# Patient Record
Sex: FEMALE | Race: WHITE | NOT HISPANIC OR LATINO | Employment: FULL TIME | URBAN - METROPOLITAN AREA
[De-identification: names, ages, dates, MRNs, and addresses within clinical notes are randomized per-mention and may not be internally consistent; named-entity substitution may affect disease eponyms.]

---

## 2023-03-15 PROBLEM — Z04.9 CONDITION NOT FOUND: Status: ACTIVE | Noted: 2023-03-15

## 2023-03-15 PROBLEM — R60.0 LEG EDEMA, LEFT: Status: ACTIVE | Noted: 2023-03-15

## 2023-03-15 PROBLEM — D50.9 FE DEFICIENCY ANEMIA: Status: ACTIVE | Noted: 2023-03-15

## 2023-03-15 PROBLEM — B35.3 TINEA PEDIS: Status: ACTIVE | Noted: 2023-03-15

## 2023-03-15 PROBLEM — D49.89: Status: ACTIVE | Noted: 2023-03-15

## 2023-03-15 PROBLEM — R10.30 ABDOMINAL PAIN, LOWER: Status: ACTIVE | Noted: 2023-03-15

## 2023-03-15 PROBLEM — R14.0 ABDOMINAL DISTENSION: Status: ACTIVE | Noted: 2023-03-15

## 2023-03-15 PROBLEM — E03.9 HYPOTHYROIDISM: Status: ACTIVE | Noted: 2023-03-15

## 2023-03-15 PROBLEM — B35.0 TINEA CAPITIS: Status: ACTIVE | Noted: 2023-03-15

## 2023-03-15 PROBLEM — L30.9 DERMATITIS, ECZEMATOID: Status: ACTIVE | Noted: 2023-03-15

## 2023-03-15 PROBLEM — R14.0 ABDOMINAL BLOATING: Status: ACTIVE | Noted: 2023-03-15

## 2023-03-15 PROBLEM — J01.90 ACUTE SINUSITIS: Status: ACTIVE | Noted: 2023-03-15

## 2023-03-15 PROBLEM — G43.909 MIGRAINE HEADACHE: Status: ACTIVE | Noted: 2023-03-15

## 2023-03-15 PROBLEM — J02.9 SORE THROAT: Status: ACTIVE | Noted: 2023-03-15

## 2023-03-15 PROBLEM — R19.7 DIARRHEA: Status: ACTIVE | Noted: 2023-03-15

## 2023-03-15 PROBLEM — R55 SYNCOPE AND COLLAPSE: Status: ACTIVE | Noted: 2023-03-15

## 2023-03-15 PROBLEM — N63.20 LEFT BREAST LUMP: Status: ACTIVE | Noted: 2023-03-15

## 2023-03-15 PROBLEM — R31.29 HEMATURIA, MICROSCOPIC: Status: ACTIVE | Noted: 2023-03-15

## 2023-03-15 PROBLEM — R09.81 SINUS CONGESTION: Status: ACTIVE | Noted: 2023-03-15

## 2023-03-15 PROBLEM — Z95.828 PORT-A-CATH IN PLACE: Status: ACTIVE | Noted: 2023-03-15

## 2023-03-15 PROBLEM — R92.8 ABNORMAL MAMMOGRAM: Status: ACTIVE | Noted: 2023-03-15

## 2023-03-15 PROBLEM — T82.9XXA COMPLICATION OF CENTRAL VENOUS CATHETER: Status: ACTIVE | Noted: 2023-03-15

## 2023-03-15 PROBLEM — J30.9 ALLERGIC RHINITIS WITH POSTNASAL DRIP: Status: ACTIVE | Noted: 2023-03-15

## 2023-03-15 PROBLEM — J30.9 ALLERGIC SINUSITIS: Status: ACTIVE | Noted: 2023-03-15

## 2023-03-15 PROBLEM — G47.30 SLEEP APNEA: Status: ACTIVE | Noted: 2023-03-15

## 2023-03-15 PROBLEM — M25.579 ANKLE JOINT PAIN: Status: ACTIVE | Noted: 2023-03-15

## 2023-03-15 PROBLEM — F41.9 ANXIETY AND DEPRESSION: Status: ACTIVE | Noted: 2023-03-15

## 2023-03-15 PROBLEM — R21 DISCOID RASH: Status: ACTIVE | Noted: 2023-03-15

## 2023-03-15 PROBLEM — J01.10 ACUTE FRONTAL SINUSITIS: Status: ACTIVE | Noted: 2023-03-15

## 2023-03-15 PROBLEM — R53.83 FATIGUE: Status: ACTIVE | Noted: 2023-03-15

## 2023-03-15 PROBLEM — R63.5 UNEXPLAINED WEIGHT GAIN: Status: ACTIVE | Noted: 2023-03-15

## 2023-03-15 PROBLEM — J32.9 SINUSITIS: Status: ACTIVE | Noted: 2023-03-15

## 2023-03-15 PROBLEM — G70.00: Status: ACTIVE | Noted: 2023-03-15

## 2023-03-15 PROBLEM — S06.0XAA CONCUSSION: Status: ACTIVE | Noted: 2023-03-15

## 2023-03-15 PROBLEM — R07.9 CHEST PAIN: Status: ACTIVE | Noted: 2023-03-15

## 2023-03-15 PROBLEM — R10.32 LEFT INGUINAL PAIN: Status: ACTIVE | Noted: 2023-03-15

## 2023-03-15 PROBLEM — F32.A ANXIETY AND DEPRESSION: Status: ACTIVE | Noted: 2023-03-15

## 2023-03-15 PROBLEM — K52.9 GASTROENTERITIS: Status: ACTIVE | Noted: 2023-03-15

## 2023-03-15 PROBLEM — R09.82 ALLERGIC RHINITIS WITH POSTNASAL DRIP: Status: ACTIVE | Noted: 2023-03-15

## 2023-03-15 PROBLEM — L03.211 CELLULITIS OF CHIN: Status: ACTIVE | Noted: 2023-03-15

## 2023-03-15 PROBLEM — M32.9 SYSTEMIC LUPUS ERYTHEMATOSUS (MULTI): Status: ACTIVE | Noted: 2023-03-15

## 2023-03-15 PROBLEM — F32.A DEPRESSION: Status: ACTIVE | Noted: 2023-03-15

## 2023-03-15 PROBLEM — R00.2 PALPITATIONS: Status: ACTIVE | Noted: 2023-03-15

## 2023-03-15 PROBLEM — L28.2 PRURITIC RASH: Status: ACTIVE | Noted: 2023-03-15

## 2023-03-15 PROBLEM — M77.8 TENDINITIS OF FLEXOR TENDON OF RIGHT HAND: Status: ACTIVE | Noted: 2023-03-15

## 2023-03-15 PROBLEM — H93.8X3 SENSATION OF FULLNESS IN BOTH EARS: Status: ACTIVE | Noted: 2023-03-15

## 2023-03-15 PROBLEM — N64.4 PAIN OF LEFT BREAST: Status: ACTIVE | Noted: 2023-03-15

## 2023-03-15 PROBLEM — R42 LIGHTHEADEDNESS: Status: ACTIVE | Noted: 2023-03-15

## 2023-03-15 PROBLEM — D70.9 NEUTROPENIA (CMS-HCC): Status: ACTIVE | Noted: 2023-03-15

## 2023-03-15 PROBLEM — R68.89 FLU-LIKE SYMPTOMS: Status: ACTIVE | Noted: 2023-03-15

## 2023-03-15 PROBLEM — G40.909 SEIZURE, EPILEPTIC (MULTI): Status: ACTIVE | Noted: 2023-03-15

## 2023-03-15 PROBLEM — R19.8 INCREASED ABDOMINAL GIRTH: Status: ACTIVE | Noted: 2023-03-15

## 2023-03-15 PROBLEM — R51.9 HEADACHE, CHRONIC DAILY: Status: ACTIVE | Noted: 2023-03-15

## 2023-03-15 PROBLEM — Z98.890 HISTORY OF VASCULAR ACCESS DEVICE: Status: ACTIVE | Noted: 2023-03-15

## 2023-03-15 PROBLEM — M76.71 PERONEAL TENDINITIS OF RIGHT LOWER EXTREMITY: Status: ACTIVE | Noted: 2023-03-15

## 2023-03-15 PROBLEM — L70.9 ADULT ACNE: Status: ACTIVE | Noted: 2023-03-15

## 2023-03-15 PROBLEM — K21.9 GERD WITHOUT ESOPHAGITIS: Status: ACTIVE | Noted: 2023-03-15

## 2023-03-15 PROBLEM — K52.9 COLITIS: Status: ACTIVE | Noted: 2023-03-15

## 2023-03-15 PROBLEM — J22 ACUTE RESPIRATORY INFECTION: Status: ACTIVE | Noted: 2023-03-15

## 2023-03-15 PROBLEM — I10 EPISODE OF HYPERTENSION: Status: ACTIVE | Noted: 2023-03-15

## 2023-03-15 RX ORDER — DIPHENHYDRAMINE HCL 25 MG
1-2 TABLET ORAL AS NEEDED
COMMUNITY
Start: 2021-06-02

## 2023-03-15 RX ORDER — FERROUS SULFATE 325(65) MG
1 TABLET ORAL EVERY OTHER DAY
COMMUNITY
Start: 2016-04-19

## 2023-03-15 RX ORDER — AZELASTINE HCL 205.5 UG/1
2 SPRAY NASAL 2 TIMES DAILY
COMMUNITY
Start: 2022-06-23

## 2023-03-15 RX ORDER — CITALOPRAM 10 MG/1
1 TABLET ORAL DAILY
COMMUNITY
Start: 2022-05-17 | End: 2023-06-26 | Stop reason: SDUPTHER

## 2023-03-15 RX ORDER — METOPROLOL SUCCINATE 25 MG/1
1.5 TABLET, EXTENDED RELEASE ORAL EVERY EVENING
COMMUNITY
Start: 2020-10-07 | End: 2023-10-09 | Stop reason: SDUPTHER

## 2023-03-15 RX ORDER — FOLIC ACID 0.8 MG
1 TABLET ORAL DAILY
COMMUNITY
Start: 2015-09-17

## 2023-03-15 RX ORDER — HEPARIN SODIUM 1000 [USP'U]/ML
INJECTION, SOLUTION INTRAVENOUS; SUBCUTANEOUS
COMMUNITY
Start: 2022-02-16

## 2023-03-15 RX ORDER — LAMOTRIGINE 200 MG/1
1 TABLET ORAL 2 TIMES DAILY
COMMUNITY
Start: 2013-10-06 | End: 2023-05-10 | Stop reason: SDUPTHER

## 2023-03-15 RX ORDER — VIT C/E/ZN/COPPR/LUTEIN/ZEAXAN 250MG-90MG
1 CAPSULE ORAL DAILY
COMMUNITY
Start: 2022-02-02

## 2023-03-15 RX ORDER — ECULIZUMAB 300 MG/30ML
INJECTION, SOLUTION, CONCENTRATE INTRAVENOUS
COMMUNITY

## 2023-03-15 RX ORDER — LIDOCAINE AND PRILOCAINE 25; 25 MG/G; MG/G
CREAM TOPICAL
COMMUNITY
Start: 2018-01-02

## 2023-03-15 RX ORDER — LOPERAMIDE HCL 2 MG
TABLET ORAL
COMMUNITY
Start: 2022-10-05

## 2023-03-20 ENCOUNTER — OFFICE VISIT (OUTPATIENT)
Dept: PRIMARY CARE | Facility: CLINIC | Age: 43
End: 2023-03-20
Payer: COMMERCIAL

## 2023-03-20 VITALS
SYSTOLIC BLOOD PRESSURE: 108 MMHG | WEIGHT: 134 LBS | DIASTOLIC BLOOD PRESSURE: 85 MMHG | BODY MASS INDEX: 22.33 KG/M2 | HEART RATE: 97 BPM | HEIGHT: 65 IN

## 2023-03-20 DIAGNOSIS — H65.04 RECURRENT ACUTE SEROUS OTITIS MEDIA OF RIGHT EAR: Primary | ICD-10-CM

## 2023-03-20 PROCEDURE — 1036F TOBACCO NON-USER: CPT | Performed by: FAMILY MEDICINE

## 2023-03-20 PROCEDURE — 99213 OFFICE O/P EST LOW 20 MIN: CPT | Performed by: FAMILY MEDICINE

## 2023-03-20 RX ORDER — PREDNISONE 20 MG/1
20 TABLET ORAL 2 TIMES DAILY
Qty: 6 TABLET | Refills: 0 | Status: SHIPPED | OUTPATIENT
Start: 2023-03-20 | End: 2023-03-23

## 2023-03-20 ASSESSMENT — ENCOUNTER SYMPTOMS
MUSCULOSKELETAL NEGATIVE: 1
NEUROLOGICAL NEGATIVE: 1
CONSTITUTIONAL NEGATIVE: 1
RESPIRATORY NEGATIVE: 1
GASTROINTESTINAL NEGATIVE: 1
SINUS PRESSURE: 1
CARDIOVASCULAR NEGATIVE: 1

## 2023-03-28 ENCOUNTER — TELEPHONE (OUTPATIENT)
Dept: PRIMARY CARE | Facility: CLINIC | Age: 43
End: 2023-03-28

## 2023-04-06 ENCOUNTER — OFFICE VISIT (OUTPATIENT)
Dept: PRIMARY CARE | Facility: CLINIC | Age: 43
End: 2023-04-06
Payer: COMMERCIAL

## 2023-04-06 VITALS
BODY MASS INDEX: 22.16 KG/M2 | DIASTOLIC BLOOD PRESSURE: 80 MMHG | SYSTOLIC BLOOD PRESSURE: 113 MMHG | HEART RATE: 85 BPM | WEIGHT: 133 LBS | HEIGHT: 65 IN

## 2023-04-06 DIAGNOSIS — R92.8 OTHER ABNORMAL AND INCONCLUSIVE FINDINGS ON DIAGNOSTIC IMAGING OF BREAST: ICD-10-CM

## 2023-04-06 DIAGNOSIS — L30.9 ECZEMA, UNSPECIFIED TYPE: ICD-10-CM

## 2023-04-06 DIAGNOSIS — M32.9 SYSTEMIC LUPUS ERYTHEMATOSUS, UNSPECIFIED SLE TYPE, UNSPECIFIED ORGAN INVOLVEMENT STATUS (MULTI): ICD-10-CM

## 2023-04-06 DIAGNOSIS — I10 EPISODE OF HYPERTENSION: Primary | ICD-10-CM

## 2023-04-06 DIAGNOSIS — G70.00 MYASTHENIA GRAVIS ASSOCIATED WITH THYMOMA (MULTI): ICD-10-CM

## 2023-04-06 DIAGNOSIS — K21.9 GERD WITHOUT ESOPHAGITIS: ICD-10-CM

## 2023-04-06 DIAGNOSIS — Z12.31 SCREENING MAMMOGRAM, ENCOUNTER FOR: ICD-10-CM

## 2023-04-06 DIAGNOSIS — D70.2 OTHER DRUG-INDUCED NEUTROPENIA (CMS-HCC): ICD-10-CM

## 2023-04-06 DIAGNOSIS — Z00.00 LABORATORY TESTS ORDERED AS PART OF A COMPLETE PHYSICAL EXAM (CPE): ICD-10-CM

## 2023-04-06 DIAGNOSIS — E32.9 THYMUS DISORDER (MULTI): ICD-10-CM

## 2023-04-06 DIAGNOSIS — D49.89 MYASTHENIA GRAVIS ASSOCIATED WITH THYMOMA (MULTI): ICD-10-CM

## 2023-04-06 DIAGNOSIS — G40.909 NONINTRACTABLE EPILEPSY WITHOUT STATUS EPILEPTICUS, UNSPECIFIED EPILEPSY TYPE (MULTI): ICD-10-CM

## 2023-04-06 DIAGNOSIS — F32.A DEPRESSION, UNSPECIFIED DEPRESSION TYPE: ICD-10-CM

## 2023-04-06 PROBLEM — J02.9 SORE THROAT: Status: RESOLVED | Noted: 2023-03-15 | Resolved: 2023-04-06

## 2023-04-06 PROBLEM — R09.81 SINUS CONGESTION: Status: RESOLVED | Noted: 2023-03-15 | Resolved: 2023-04-06

## 2023-04-06 PROBLEM — J32.9 SINUSITIS: Status: RESOLVED | Noted: 2023-03-15 | Resolved: 2023-04-06

## 2023-04-06 PROBLEM — F43.21 ADJUSTMENT DISORDER WITH DEPRESSED MOOD: Status: ACTIVE | Noted: 2022-03-23

## 2023-04-06 PROBLEM — J30.9 ALLERGIC SINUSITIS: Status: RESOLVED | Noted: 2023-03-15 | Resolved: 2023-04-06

## 2023-04-06 PROBLEM — J01.90 ACUTE SINUSITIS: Status: RESOLVED | Noted: 2023-03-15 | Resolved: 2023-04-06

## 2023-04-06 PROBLEM — R68.89 FLU-LIKE SYMPTOMS: Status: RESOLVED | Noted: 2023-03-15 | Resolved: 2023-04-06

## 2023-04-06 PROBLEM — J22 ACUTE RESPIRATORY INFECTION: Status: RESOLVED | Noted: 2023-03-15 | Resolved: 2023-04-06

## 2023-04-06 PROBLEM — J01.10 ACUTE FRONTAL SINUSITIS: Status: RESOLVED | Noted: 2023-03-15 | Resolved: 2023-04-06

## 2023-04-06 PROBLEM — R31.29 HEMATURIA, MICROSCOPIC: Status: RESOLVED | Noted: 2023-03-15 | Resolved: 2023-04-06

## 2023-04-06 PROCEDURE — 99396 PREV VISIT EST AGE 40-64: CPT | Performed by: FAMILY MEDICINE

## 2023-04-06 PROCEDURE — 1036F TOBACCO NON-USER: CPT | Performed by: FAMILY MEDICINE

## 2023-04-06 RX ORDER — CLOTRIMAZOLE AND BETAMETHASONE DIPROPIONATE 10; .64 MG/G; MG/G
1 CREAM TOPICAL 2 TIMES DAILY
Qty: 30 G | Refills: 0 | Status: SHIPPED | OUTPATIENT
Start: 2023-04-06 | End: 2023-06-05

## 2023-04-06 ASSESSMENT — ENCOUNTER SYMPTOMS
RESPIRATORY NEGATIVE: 1
NEUROLOGICAL NEGATIVE: 1
GASTROINTESTINAL NEGATIVE: 1
CARDIOVASCULAR NEGATIVE: 1
MUSCULOSKELETAL NEGATIVE: 1
CONSTITUTIONAL NEGATIVE: 1

## 2023-04-06 NOTE — PROGRESS NOTES
"Subjective   Patient ID: Luisa Ingram is a 43 y.o. female who presents for No chief complaint on file..    HPI fu htn, chol, depression , anemia ,serous otitis  Stable from baseline  Review of Systems   Constitutional: Negative.    HENT: Negative.     Respiratory: Negative.     Cardiovascular: Negative.    Gastrointestinal: Negative.    Musculoskeletal: Negative.    Neurological: Negative.        Objective   /80   Pulse 85   Ht 1.651 m (5' 5\")   Wt 60.3 kg (133 lb)   BMI 22.13 kg/m²     Physical Exam  Vitals reviewed.   Constitutional:       Appearance: Normal appearance. She is normal weight.   Eyes:      Extraocular Movements: Extraocular movements intact.      Conjunctiva/sclera: Conjunctivae normal.      Pupils: Pupils are equal, round, and reactive to light.   Neck:      Comments: Has port in place  Cardiovascular:      Rate and Rhythm: Normal rate and regular rhythm.      Pulses: Normal pulses.      Heart sounds: Normal heart sounds.   Pulmonary:      Effort: Pulmonary effort is normal.      Breath sounds: Normal breath sounds.   Abdominal:      General: Bowel sounds are normal.      Palpations: Abdomen is soft.   Musculoskeletal:         General: Normal range of motion.   Skin:     General: Skin is warm and dry.   Neurological:      General: No focal deficit present.      Mental Status: She is alert and oriented to person, place, and time. Mental status is at baseline.         Assessment/Plan   Problem List Items Addressed This Visit          Nervous    Myasthenia gravis associated with thymoma (CMS/HCC)    Seizure, epileptic (CMS/HCC)       Circulatory    Episode of hypertension - Primary    Thymus disorder (CMS/HCC)       Digestive    GERD without esophagitis       Other    Depression    Neutropenia (CMS/HCC)    Systemic lupus erythematosus (CMS/HCC)     Other Visit Diagnoses       Laboratory tests ordered as part of a complete physical exam (CPE)        Screening mammogram, encounter for     "          Bw from krish is stable will recheck 1mo and will add lipid panel  Get mammogram

## 2023-04-06 NOTE — PROGRESS NOTES
Pt comes in for a cpe. Pt also states is still having issues with her ear that she saw you for last time she was here.

## 2023-05-10 DIAGNOSIS — D49.89 MYASTHENIA GRAVIS ASSOCIATED WITH THYMOMA (MULTI): Primary | ICD-10-CM

## 2023-05-10 DIAGNOSIS — G70.00 MYASTHENIA GRAVIS ASSOCIATED WITH THYMOMA (MULTI): Primary | ICD-10-CM

## 2023-05-10 RX ORDER — LAMOTRIGINE 200 MG/1
200 TABLET ORAL 2 TIMES DAILY
Qty: 180 TABLET | Refills: 1 | Status: SHIPPED | OUTPATIENT
Start: 2023-05-10 | End: 2023-12-11 | Stop reason: SDUPTHER

## 2023-05-16 ENCOUNTER — APPOINTMENT (OUTPATIENT)
Dept: PRIMARY CARE | Facility: CLINIC | Age: 43
End: 2023-05-16
Payer: COMMERCIAL

## 2023-06-26 DIAGNOSIS — F32.A DEPRESSION, UNSPECIFIED DEPRESSION TYPE: Primary | ICD-10-CM

## 2023-06-26 RX ORDER — CITALOPRAM 10 MG/1
10 TABLET ORAL DAILY
Qty: 90 TABLET | Refills: 1 | Status: SHIPPED | OUTPATIENT
Start: 2023-06-26 | End: 2023-12-11 | Stop reason: SDUPTHER

## 2023-09-12 ENCOUNTER — TELEPHONE (OUTPATIENT)
Dept: PRIMARY CARE | Facility: CLINIC | Age: 43
End: 2023-09-12
Payer: COMMERCIAL

## 2023-09-12 DIAGNOSIS — H65.04 RECURRENT ACUTE SEROUS OTITIS MEDIA OF RIGHT EAR: Primary | ICD-10-CM

## 2023-09-12 RX ORDER — CEFPROZIL 500 MG/1
500 TABLET, FILM COATED ORAL 2 TIMES DAILY
Qty: 14 TABLET | Refills: 0 | Status: SHIPPED | OUTPATIENT
Start: 2023-09-12 | End: 2023-09-19

## 2023-10-06 ENCOUNTER — TELEPHONE (OUTPATIENT)
Dept: CARDIOLOGY | Facility: HOSPITAL | Age: 43
End: 2023-10-06
Payer: COMMERCIAL

## 2023-10-09 DIAGNOSIS — R00.2 PALPITATIONS: Primary | ICD-10-CM

## 2023-10-12 ENCOUNTER — OFFICE VISIT (OUTPATIENT)
Dept: PRIMARY CARE | Facility: CLINIC | Age: 43
End: 2023-10-12
Payer: COMMERCIAL

## 2023-10-12 VITALS
HEART RATE: 96 BPM | SYSTOLIC BLOOD PRESSURE: 108 MMHG | WEIGHT: 134 LBS | HEIGHT: 65 IN | DIASTOLIC BLOOD PRESSURE: 88 MMHG | BODY MASS INDEX: 22.33 KG/M2

## 2023-10-12 DIAGNOSIS — F41.9 ANXIETY AND DEPRESSION: ICD-10-CM

## 2023-10-12 DIAGNOSIS — E78.9 ABNORMAL CHOLESTEROL TEST: ICD-10-CM

## 2023-10-12 DIAGNOSIS — I10 EPISODE OF HYPERTENSION: Primary | ICD-10-CM

## 2023-10-12 DIAGNOSIS — F32.A ANXIETY AND DEPRESSION: ICD-10-CM

## 2023-10-12 DIAGNOSIS — Z23 NEED FOR INFLUENZA VACCINATION: ICD-10-CM

## 2023-10-12 PROBLEM — F45.8 BRUXISM: Status: ACTIVE | Noted: 2023-10-12

## 2023-10-12 PROBLEM — M26.629 TMJ PAIN DYSFUNCTION SYNDROME: Status: ACTIVE | Noted: 2023-10-12

## 2023-10-12 PROBLEM — H92.02 OTALGIA, LEFT: Status: ACTIVE | Noted: 2023-10-12

## 2023-10-12 LAB
CHOLEST SERPL-MCNC: 202 MG/DL (ref 0–199)
CHOLESTEROL/HDL RATIO: 3.4
HDLC SERPL-MCNC: 59.9 MG/DL
LDLC SERPL CALC-MCNC: 122 MG/DL
NON HDL CHOLESTEROL: 142 MG/DL (ref 0–149)
TRIGL SERPL-MCNC: 101 MG/DL (ref 0–149)
VLDL: 20 MG/DL (ref 0–40)

## 2023-10-12 PROCEDURE — 36415 COLL VENOUS BLD VENIPUNCTURE: CPT

## 2023-10-12 PROCEDURE — 99213 OFFICE O/P EST LOW 20 MIN: CPT | Performed by: FAMILY MEDICINE

## 2023-10-12 PROCEDURE — 90686 IIV4 VACC NO PRSV 0.5 ML IM: CPT | Performed by: FAMILY MEDICINE

## 2023-10-12 PROCEDURE — G0008 ADMIN INFLUENZA VIRUS VAC: HCPCS | Performed by: FAMILY MEDICINE

## 2023-10-12 PROCEDURE — 1036F TOBACCO NON-USER: CPT | Performed by: FAMILY MEDICINE

## 2023-10-12 PROCEDURE — 80061 LIPID PANEL: CPT

## 2023-10-12 ASSESSMENT — ENCOUNTER SYMPTOMS
NEUROLOGICAL NEGATIVE: 1
CARDIOVASCULAR NEGATIVE: 1
GASTROINTESTINAL NEGATIVE: 1
MUSCULOSKELETAL NEGATIVE: 1
CONSTITUTIONAL NEGATIVE: 1
RESPIRATORY NEGATIVE: 1

## 2023-10-12 NOTE — PROGRESS NOTES
"Subjective   Patient ID: Luisa Ingram is a 43 y.o. female who presents for No chief complaint on file..    HPI lupuls cont w sp, myasthenia cont w neuro, depression stab le no suicidal or omicidal ideation , due for lipid    Review of Systems   Constitutional: Negative.    HENT: Negative.     Respiratory: Negative.     Cardiovascular: Negative.    Gastrointestinal: Negative.    Musculoskeletal: Negative.    Neurological: Negative.        Objective   /88   Pulse 96   Ht 1.651 m (5' 5\")   Wt 60.8 kg (134 lb)   BMI 22.30 kg/m²     Physical Exam  Vitals reviewed.   Constitutional:       Appearance: Normal appearance. She is normal weight.   Eyes:      Extraocular Movements: Extraocular movements intact.      Conjunctiva/sclera: Conjunctivae normal.      Pupils: Pupils are equal, round, and reactive to light.   Cardiovascular:      Rate and Rhythm: Normal rate and regular rhythm.      Pulses: Normal pulses.      Heart sounds: Normal heart sounds.   Pulmonary:      Effort: Pulmonary effort is normal.      Breath sounds: Normal breath sounds.   Abdominal:      General: Bowel sounds are normal.      Palpations: Abdomen is soft.   Musculoskeletal:         General: Normal range of motion.   Skin:     General: Skin is warm and dry.   Neurological:      General: No focal deficit present.      Mental Status: She is alert and oriented to person, place, and time. Mental status is at baseline.         Assessment/Plan   Problem List Items Addressed This Visit             ICD-10-CM    Anxiety and depression F41.9, F32.A    Episode of hypertension - Primary I10     Other Visit Diagnoses         Codes    Need for influenza vaccination     Z23    Relevant Orders    Flu vaccine (IIV4) age 6 months and greater, preservative free    Abnormal cholesterol test     E78.9    Relevant Orders    Lipid Panel               "

## 2023-10-12 NOTE — PROGRESS NOTES
Patient informed no antibiotic needed, per Dr. Jones. She states understanding. No further questions.   Pt comes in for fu. Pt has no concerns today.

## 2023-10-13 ENCOUNTER — TELEPHONE (OUTPATIENT)
Dept: PRIMARY CARE | Facility: CLINIC | Age: 43
End: 2023-10-13
Payer: COMMERCIAL

## 2023-10-13 NOTE — TELEPHONE ENCOUNTER
Spoke with patient       ----- Message from Kyler Crandall MD sent at 10/13/2023  8:00 AM EDT -----  All results are stable  no change

## 2023-10-17 RX ORDER — METOPROLOL SUCCINATE 25 MG/1
37.5 TABLET, EXTENDED RELEASE ORAL EVERY EVENING
Qty: 90 TABLET | Refills: 3 | Status: SHIPPED | OUTPATIENT
Start: 2023-10-17

## 2023-12-11 DIAGNOSIS — G70.00 MYASTHENIA GRAVIS ASSOCIATED WITH THYMOMA (MULTI): ICD-10-CM

## 2023-12-11 DIAGNOSIS — F32.A DEPRESSION, UNSPECIFIED DEPRESSION TYPE: ICD-10-CM

## 2023-12-11 DIAGNOSIS — D49.89 MYASTHENIA GRAVIS ASSOCIATED WITH THYMOMA (MULTI): ICD-10-CM

## 2023-12-11 RX ORDER — LAMOTRIGINE 200 MG/1
200 TABLET ORAL 2 TIMES DAILY
Qty: 180 TABLET | Refills: 1 | Status: SHIPPED | OUTPATIENT
Start: 2023-12-11

## 2023-12-11 RX ORDER — CITALOPRAM 10 MG/1
10 TABLET ORAL DAILY
Qty: 90 TABLET | Refills: 1 | Status: SHIPPED | OUTPATIENT
Start: 2023-12-11

## 2024-01-11 ENCOUNTER — TELEPHONE (OUTPATIENT)
Dept: PRIMARY CARE | Facility: CLINIC | Age: 44
End: 2024-01-11
Payer: COMMERCIAL

## 2024-01-11 DIAGNOSIS — M32.9 LUPUS (MULTI): Primary | ICD-10-CM

## 2024-01-11 NOTE — TELEPHONE ENCOUNTER
Patient states her lupus physician is retiring she is requesting referral for Dr Sehrwin Smith for lupus.

## 2024-02-09 ENCOUNTER — LAB (OUTPATIENT)
Dept: LAB | Facility: LAB | Age: 44
End: 2024-02-09
Payer: COMMERCIAL

## 2024-02-09 ENCOUNTER — OFFICE VISIT (OUTPATIENT)
Dept: RHEUMATOLOGY | Facility: CLINIC | Age: 44
End: 2024-02-09
Payer: COMMERCIAL

## 2024-02-09 VITALS
DIASTOLIC BLOOD PRESSURE: 76 MMHG | SYSTOLIC BLOOD PRESSURE: 109 MMHG | HEART RATE: 76 BPM | WEIGHT: 138 LBS | BODY MASS INDEX: 22.99 KG/M2 | TEMPERATURE: 98.4 F | HEIGHT: 65 IN

## 2024-02-09 DIAGNOSIS — M35.9 UNDIFFERENTIATED CONNECTIVE TISSUE DISEASE (MULTI): ICD-10-CM

## 2024-02-09 DIAGNOSIS — M32.9 LUPUS (MULTI): Primary | ICD-10-CM

## 2024-02-09 DIAGNOSIS — M32.9 LUPUS (MULTI): ICD-10-CM

## 2024-02-09 DIAGNOSIS — G70.00 MYASTHENIA GRAVIS (MULTI): ICD-10-CM

## 2024-02-09 LAB
ALBUMIN SERPL BCP-MCNC: 4.3 G/DL (ref 3.4–5)
ALP SERPL-CCNC: 42 U/L (ref 33–110)
ALT SERPL W P-5'-P-CCNC: 12 U/L (ref 7–45)
ANION GAP SERPL CALC-SCNC: 13 MMOL/L (ref 10–20)
APPEARANCE UR: CLEAR
AST SERPL W P-5'-P-CCNC: 17 U/L (ref 9–39)
BASOPHILS # BLD AUTO: 0.05 X10*3/UL (ref 0–0.1)
BASOPHILS NFR BLD AUTO: 1.2 %
BILIRUB SERPL-MCNC: 0.4 MG/DL (ref 0–1.2)
BILIRUB UR STRIP.AUTO-MCNC: NEGATIVE MG/DL
BUN SERPL-MCNC: 9 MG/DL (ref 6–23)
C3 SERPL-MCNC: 123 MG/DL (ref 87–200)
C4 SERPL-MCNC: 27 MG/DL (ref 10–50)
CALCIUM SERPL-MCNC: 9.9 MG/DL (ref 8.6–10.6)
CHLORIDE SERPL-SCNC: 105 MMOL/L (ref 98–107)
CO2 SERPL-SCNC: 27 MMOL/L (ref 21–32)
COLOR UR: COLORLESS
CREAT SERPL-MCNC: 0.8 MG/DL (ref 0.5–1.05)
CREAT UR-MCNC: 51.7 MG/DL (ref 20–320)
CRP SERPL-MCNC: 0.11 MG/DL
EGFRCR SERPLBLD CKD-EPI 2021: >90 ML/MIN/1.73M*2
EOSINOPHIL # BLD AUTO: 0.24 X10*3/UL (ref 0–0.7)
EOSINOPHIL NFR BLD AUTO: 5.9 %
ERYTHROCYTE [DISTWIDTH] IN BLOOD BY AUTOMATED COUNT: 12.2 % (ref 11.5–14.5)
ERYTHROCYTE [SEDIMENTATION RATE] IN BLOOD BY WESTERGREN METHOD: 6 MM/H (ref 0–20)
GLUCOSE SERPL-MCNC: 82 MG/DL (ref 74–99)
GLUCOSE UR STRIP.AUTO-MCNC: NORMAL MG/DL
HCT VFR BLD AUTO: 38.5 % (ref 36–46)
HGB BLD-MCNC: 13.5 G/DL (ref 12–16)
IMM GRANULOCYTES # BLD AUTO: 0 X10*3/UL (ref 0–0.7)
IMM GRANULOCYTES NFR BLD AUTO: 0 % (ref 0–0.9)
KETONES UR STRIP.AUTO-MCNC: NEGATIVE MG/DL
LEUKOCYTE ESTERASE UR QL STRIP.AUTO: NEGATIVE
LYMPHOCYTES # BLD AUTO: 1.51 X10*3/UL (ref 1.2–4.8)
LYMPHOCYTES NFR BLD AUTO: 37.3 %
MCH RBC QN AUTO: 30.5 PG (ref 26–34)
MCHC RBC AUTO-ENTMCNC: 35.1 G/DL (ref 32–36)
MCV RBC AUTO: 87 FL (ref 80–100)
MONOCYTES # BLD AUTO: 0.31 X10*3/UL (ref 0.1–1)
MONOCYTES NFR BLD AUTO: 7.7 %
NEUTROPHILS # BLD AUTO: 1.94 X10*3/UL (ref 1.2–7.7)
NEUTROPHILS NFR BLD AUTO: 47.9 %
NITRITE UR QL STRIP.AUTO: NEGATIVE
NRBC BLD-RTO: 0 /100 WBCS (ref 0–0)
PH UR STRIP.AUTO: 5.5 [PH]
PLATELET # BLD AUTO: 272 X10*3/UL (ref 150–450)
POTASSIUM SERPL-SCNC: 4.3 MMOL/L (ref 3.5–5.3)
PROT SERPL-MCNC: 6.7 G/DL (ref 6.4–8.2)
PROT UR STRIP.AUTO-MCNC: NEGATIVE MG/DL
PROT UR-ACNC: <4 MG/DL (ref 5–24)
PROT/CREAT UR: ABNORMAL MG/G{CREAT}
RBC # BLD AUTO: 4.42 X10*6/UL (ref 4–5.2)
RBC # UR STRIP.AUTO: NEGATIVE /UL
SODIUM SERPL-SCNC: 141 MMOL/L (ref 136–145)
SP GR UR STRIP.AUTO: 1.01
UROBILINOGEN UR STRIP.AUTO-MCNC: NORMAL MG/DL
WBC # BLD AUTO: 4.1 X10*3/UL (ref 4.4–11.3)

## 2024-02-09 PROCEDURE — 99214 OFFICE O/P EST MOD 30 MIN: CPT | Performed by: INTERNAL MEDICINE

## 2024-02-09 PROCEDURE — 1036F TOBACCO NON-USER: CPT | Performed by: INTERNAL MEDICINE

## 2024-02-09 PROCEDURE — 86235 NUCLEAR ANTIGEN ANTIBODY: CPT

## 2024-02-09 PROCEDURE — 82570 ASSAY OF URINE CREATININE: CPT

## 2024-02-09 PROCEDURE — 99204 OFFICE O/P NEW MOD 45 MIN: CPT | Performed by: INTERNAL MEDICINE

## 2024-02-09 PROCEDURE — 85652 RBC SED RATE AUTOMATED: CPT

## 2024-02-09 PROCEDURE — 80053 COMPREHEN METABOLIC PANEL: CPT

## 2024-02-09 PROCEDURE — 85025 COMPLETE CBC W/AUTO DIFF WBC: CPT

## 2024-02-09 PROCEDURE — 86160 COMPLEMENT ANTIGEN: CPT

## 2024-02-09 PROCEDURE — 81003 URINALYSIS AUTO W/O SCOPE: CPT

## 2024-02-09 PROCEDURE — 36415 COLL VENOUS BLD VENIPUNCTURE: CPT

## 2024-02-09 PROCEDURE — 84156 ASSAY OF PROTEIN URINE: CPT

## 2024-02-09 PROCEDURE — 86162 COMPLEMENT TOTAL (CH50): CPT

## 2024-02-09 PROCEDURE — 86140 C-REACTIVE PROTEIN: CPT

## 2024-02-09 PROCEDURE — 86038 ANTINUCLEAR ANTIBODIES: CPT

## 2024-02-09 PROCEDURE — 86225 DNA ANTIBODY NATIVE: CPT

## 2024-02-09 ASSESSMENT — PAIN SCALES - GENERAL: PAINLEVEL: 3

## 2024-02-09 NOTE — PROGRESS NOTES
Subjective   Patient ID: Luisa Ingram is a 43 y.o. female who presents for New Patient Visit (New patient).    HPI  42 yo female with myasthenia gravis  In 2008, she was diagnosed with SLE based on arthralgia, myagia, Raynaud, photosensitivity, positive CAROLANN and low titer positive dsDNA  HCQ was started, but she did not tolerate HCQ  In 2011, she was diagnosed with M gravis, she had a tymectomy and currently, she is suing eculizumab, Soliris.  Her M gravis is in remission now  Today, She reports stiffness in her hands, but no joint pain.  USG showed inflammation in her right wrist  She is having Raynaud for many years and it is getting worse.  She also reports dry eyes and dry mouth  She denies oral ulcer, hair loss, fever    ROS  Joint pain in hands: negative  Joint swelling: negative  Morning stiffness and duration: negative   strength: normal  Oral ulcer: negative  Genital ulcer: negative  Raynaud phenomenon: negative  Chest pain/dyspnea: negative  Low back pain: negative  Visual problem: negative  Dry eyes/dry mouth: negative  Skin rash/scaling/psoriasis: negative       Objective     PEXAM  VS reviewed, WNL  General: Alert, no distress   HEENT: Normocephalic/atraumatic, No alopecia. PERRLA. Sclera white, conjunctiva pink, no malar rash. no oral or nasal ulcer. Oral cavity pink and moist, no erythema or exudate, dentition good.   Neck: supple  Respiratory: CTA B, no adventitious breath sounds  Cardiac: RRR, no murmurs, carotid, or bruits  Abdominal: symmetrical, soft, non-tender, non-distended, normoactive BSx4 quadrants, no CVA tenderness or suprapubic tenderness  MSK: Joints of upper and lower extremities were assessed for synovitis and ROM.    +mild tenderness in her right wrist and minor capillary changes in her nailfolds, but no sclerodactyli, tightness in her face  Extremities: no clubbing, no cyanosis, no edema  Skin: Skin warm and moist.   Neuro: non-focal, Strength 5/5 throughout. Normal gait. No  cerebellar pathologic exam     Assessment/Plan   44 yo female with Myastenia gravis and remote h/o SLE  She is using eculizumab for MG which is remission now.  SLE dx was based on arthralgia, myalgia, photosensitivity, Raynaud, pos CAROLANN, did not tolerate HCQ in 2008.  Today, she reports joint pain, stiffness.  Recent USG showed some inflammation in her right wirst.  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

## 2024-02-12 LAB
ANA PATTERN: ABNORMAL
ANA SER QL HEP2 SUBST: POSITIVE
ANA TITR SER IF: ABNORMAL {TITER}
CENTROMERE B AB SER-ACNC: <0.2 AI
CH50 SERPL-ACNC: <12.5 U/ML (ref 38.7–89.9)
CHROMATIN AB SERPL-ACNC: <0.2 AI
DSDNA AB SER-ACNC: 11 IU/ML
ENA JO1 AB SER QL IA: <0.2 AI
ENA RNP AB SER IA-ACNC: 0.3 AI
ENA SCL70 AB SER QL IA: <0.2 AI
ENA SM AB SER IA-ACNC: <0.2 AI
ENA SM+RNP AB SER QL IA: <0.2 AI
ENA SS-A AB SER IA-ACNC: 0.2 AI
ENA SS-B AB SER IA-ACNC: 0.2 AI
RIBOSOMAL P AB SER-ACNC: <0.2 AI

## 2024-02-17 LAB — C2 SERPL-MCNC: 1.1 MG/DL (ref 1.6–4)

## 2024-03-27 NOTE — PROGRESS NOTES
"Subjective   Patient ID: Luisa Ingram is a 42 y.o. female who presents for No chief complaint on file..    HPI left ear pain and difficulty hearing    Review of Systems   Constitutional: Negative.    HENT:  Positive for ear pain and sinus pressure.    Respiratory: Negative.     Cardiovascular: Negative.    Gastrointestinal: Negative.    Musculoskeletal: Negative.    Neurological: Negative.        Objective   /85   Pulse 97   Ht 1.651 m (5' 5\")   Wt 60.8 kg (134 lb)   BMI 22.30 kg/m²     Physical Exam  Vitals reviewed.   Constitutional:       Appearance: Normal appearance. She is normal weight.   HENT:      Left Ear: Tympanic membrane, ear canal and external ear normal.      Ears:      Comments: Serous otitis    Eyes:      Extraocular Movements: Extraocular movements intact.      Conjunctiva/sclera: Conjunctivae normal.      Pupils: Pupils are equal, round, and reactive to light.   Cardiovascular:      Rate and Rhythm: Normal rate and regular rhythm.      Pulses: Normal pulses.      Heart sounds: Normal heart sounds.   Pulmonary:      Effort: Pulmonary effort is normal.      Breath sounds: Normal breath sounds.   Abdominal:      General: Bowel sounds are normal.      Palpations: Abdomen is soft.   Musculoskeletal:         General: Normal range of motion.   Skin:     General: Skin is warm and dry.   Neurological:      General: No focal deficit present.      Mental Status: She is alert and oriented to person, place, and time. Mental status is at baseline.         Assessment/Plan   Problem List Items Addressed This Visit    None  Visit Diagnoses       Recurrent acute serous otitis media of right ear    -  Primary          Serous otits - pred and fu     "
Pt comes in for fu from urgent care at [facility]. Pt thought it was an ear inf. Pt states they dx her with a hole in her ear drum. Today- pt still has pain and blockage. Pt was ref to ent and cant get in with them until may.   
normal balance

## 2024-04-01 ENCOUNTER — TELEPHONE (OUTPATIENT)
Dept: PRIMARY CARE | Facility: CLINIC | Age: 44
End: 2024-04-01
Payer: COMMERCIAL

## 2024-04-22 ENCOUNTER — OFFICE VISIT (OUTPATIENT)
Dept: NEUROLOGY | Facility: CLINIC | Age: 44
End: 2024-04-22
Payer: COMMERCIAL

## 2024-04-22 VITALS
RESPIRATION RATE: 18 BRPM | HEART RATE: 81 BPM | SYSTOLIC BLOOD PRESSURE: 119 MMHG | BODY MASS INDEX: 23.32 KG/M2 | HEIGHT: 65 IN | WEIGHT: 140 LBS | DIASTOLIC BLOOD PRESSURE: 79 MMHG

## 2024-04-22 DIAGNOSIS — G70.00 MYASTHENIA GRAVIS ASSOCIATED WITH THYMOMA (MULTI): Primary | ICD-10-CM

## 2024-04-22 DIAGNOSIS — D49.89 MYASTHENIA GRAVIS ASSOCIATED WITH THYMOMA (MULTI): Primary | ICD-10-CM

## 2024-04-22 PROCEDURE — 99214 OFFICE O/P EST MOD 30 MIN: CPT | Performed by: PSYCHIATRY & NEUROLOGY

## 2024-04-22 ASSESSMENT — ENCOUNTER SYMPTOMS
OCCASIONAL FEELINGS OF UNSTEADINESS: 0
DEPRESSION: 0
LOSS OF SENSATION IN FEET: 0

## 2024-04-22 ASSESSMENT — COLUMBIA-SUICIDE SEVERITY RATING SCALE - C-SSRS
1. IN THE PAST MONTH, HAVE YOU WISHED YOU WERE DEAD OR WISHED YOU COULD GO TO SLEEP AND NOT WAKE UP?: NO
6. HAVE YOU EVER DONE ANYTHING, STARTED TO DO ANYTHING, OR PREPARED TO DO ANYTHING TO END YOUR LIFE?: NO
2. HAVE YOU ACTUALLY HAD ANY THOUGHTS OF KILLING YOURSELF?: NO

## 2024-04-22 ASSESSMENT — PATIENT HEALTH QUESTIONNAIRE - PHQ9
2. FEELING DOWN, DEPRESSED OR HOPELESS: NOT AT ALL
1. LITTLE INTEREST OR PLEASURE IN DOING THINGS: NOT AT ALL
SUM OF ALL RESPONSES TO PHQ9 QUESTIONS 1 AND 2: 0

## 2024-04-22 NOTE — PROGRESS NOTES
Date of Service: 4/22/2024  Patient: Luisa Ingram  MRN: [id number]    History of Present Illness:   Ms. Luisa Ingram is a 44 year old woman, whom is here today for her scheduled follow up visit for myasthenia gravis AChR antibody positive. She was last seen 9/25/2023 and is accompanied by her mother.     Since her previous appointment, she reports having some increased amount of stress in her personal life.  She had lost several family members due to illness.  Despite this, she continues to be stable with her myasthenia symptoms while on her current treatment.  She denies double vision, ptosis, shortness of breath, dysphagia, difficulty chewing or speaking, or any other weakness throughout.  She has also just returned from a trip to [place].    She continues to receive her Soliris through [facility] via [facility], and has been tolerating her dose well.  She has only noticed weakness develop when there was a delay in her infusions. She has not taken Mestinon since 2018 after suffering a great deal of GI distress and remains off of Prednisone.  Soliris is her only treatment at this time.     To Re-cap: She was s/p thymectomy due to thymoma in 2009 and diagnosed with seropositive myasthenia gravis in 2013. She had been resistant to therapy and had not tolerated immunosuppressive therapy because of side effects or neutropenia. She began receiving eculizumab (Soliris) since Spring of 2018 which she receives through her [facility] as a home infusion. Her IVIG was discontinued and received her last dose was in in November 2019, Prednisone was discontinued in July 2019, and has not taken Mestinon due to her abdominal discomfort and uncontrollable diarrhea. She denies any issues with her [facility] and tolerates this infusion without any noticeable side effects. Her last CT of chest was in 2017. She completed her meningitis vaccination required every 5 years in 2023 including both Menactra and Bexsero.     Otherwise, the  "past medical history, social history, and review of systems were reviewed. There are no significant changes.    /79   Pulse 81   Resp 18   Ht 1.651 m (5' 5\")   Wt 63.5 kg (140 lb)   BMI 23.30 kg/m²      Neuromuscular Exam:     The patient is in no apparent distress. Eyelids are normal with no ptosis despite 1 minute of sustained upgaze. Extraocular muscles are full with no subjective double vision. Eye closure strength is normal. Mouth closure strength is normal. Neck flexor and extensor strengths are normal. There is no dysarthria. Proximal muscle strength is normal.     Results:    I reviewed her blood work form February 2024.  CAROLANN was positive at with positive antiDs-DNA.  CBC showed stable neutropenia at 4100.  CMP was  normal.    Impression/Plan:    Ms/ Luisa Ingram has generalized seropositive myasthenia gravis and a history of thymoma status post thymectomy in 2002. She has also SLE.    She is now on eculizumab (Soliris) which was started in the spring of 2018 . She has tolerated it very well and has had minimal manifestations since. Her IVIG was ultimately discontinued in November 2019 and prednisone was discontinued in July 2019. Prior to this, she had been resistant to therapy and had not tolerated immunosuppressive therapy because of side effects or neutropenia. She also has had difficulty taking larger dose of Mestinon because of severe diarrhea and not taking any Mestinon for now.      She denies any issues with her [facility] and tolerates this infusion without any noticeable side effects. Her last CT of chest was in 2017.     She will continue of Soliris (eculizumab) at home with maintenance of 1200 mg every 2 weeks via [facility]. We will consider repeating her CT scan of the chest next visit. I discussed this with her. She will return to the office in 6 months. She will call for any questions.        Saray Ackerman M.D., F.A.C.P.   Director, [facility] & [facility] laboratory   [facility] " [facility]   [facility]   Professor of Neurology   [facility] [employer]    You have Myasthenia gravis and below are the medications that should not be used (contraindicated).     1. Absolute contraindications (are life-threatening)  Curare   D-penicillamine  Botulinum toxin- Botox  Interferon alpha  2. Contraindications (should be avoided)  Antibiotics-  o        Aminoglycosides- Gentamycin, Kanamycin, Amikacin, Neomycin, Streptomycin,      Tobramycin, Netilmycin, Paromomycin, spectinomycin,      Vancomycin  o        Macrolides- Azithromycin (Z-pack), Erythromycin, Clarithromycin      (Biaxin), Telithromycin   o        Fluoroquinolones Ciprofloxacin (Cipro), Norfloxacin, Levofloxacin (Levaquin)  o        Tetracyclines Tetracycline, Doxycycline     Anti-malarials- Chloroquine, hydroxychloroquine (Plaquinal)  Anti-Fungals- Voriconazole  Anti-arrhythmics- Quinidine, Procainamide, Etafenone, Peruvoside  Magnesium- Oral tablets, IV magnesium replacement.     3. Use with Caution- may exacerbate weakness in some myasthenics  Antihypertensives  o        Calcium channel blockers- Verapamil, Nifedipine, Felodipine   o        Beta blockers- Propanalol, Atenolol, Acebutolol, Practolol, Oxprenolol, Sotalol,   Nadolol, and Ophthalmic Timolol  Lithium      I personally spent 35 minutes on the day of the visit completing the review of the medical record and outside records, obtaining history and performing an appropriate physical exam, patient care, counseling and education, placing orders, independently reviewing results, communicating with the patient/family and other providers, coordinating care and performing appropriate clinical documentation.

## 2024-06-11 ENCOUNTER — OFFICE VISIT (OUTPATIENT)
Dept: CARDIOLOGY | Facility: HOSPITAL | Age: 44
End: 2024-06-11
Payer: COMMERCIAL

## 2024-06-11 VITALS
HEIGHT: 65 IN | BODY MASS INDEX: 23.56 KG/M2 | WEIGHT: 141.4 LBS | OXYGEN SATURATION: 98 % | DIASTOLIC BLOOD PRESSURE: 78 MMHG | HEART RATE: 72 BPM | SYSTOLIC BLOOD PRESSURE: 112 MMHG

## 2024-06-11 DIAGNOSIS — R00.2 PALPITATIONS: Primary | ICD-10-CM

## 2024-06-11 LAB
ATRIAL RATE: 72 BPM
P AXIS: 48 DEGREES
P OFFSET: 191 MS
P ONSET: 143 MS
PR INTERVAL: 154 MS
Q ONSET: 220 MS
QRS COUNT: 12 BEATS
QRS DURATION: 84 MS
QT INTERVAL: 358 MS
QTC CALCULATION(BAZETT): 392 MS
QTC FREDERICIA: 380 MS
R AXIS: 46 DEGREES
T AXIS: 48 DEGREES
T OFFSET: 399 MS
VENTRICULAR RATE: 72 BPM

## 2024-06-11 PROCEDURE — 99214 OFFICE O/P EST MOD 30 MIN: CPT | Performed by: NURSE PRACTITIONER

## 2024-06-11 PROCEDURE — 1036F TOBACCO NON-USER: CPT | Performed by: NURSE PRACTITIONER

## 2024-06-11 PROCEDURE — 93005 ELECTROCARDIOGRAM TRACING: CPT | Performed by: NURSE PRACTITIONER

## 2024-06-11 ASSESSMENT — ENCOUNTER SYMPTOMS: PALPITATIONS: 1

## 2024-06-11 NOTE — PROGRESS NOTES
Subjective   Luisa Ingram is a 44 y.o. female.    Chief Complaint:  Palpitations    Mrs. Ingram returns for her annual follow up. She has been feeling well from a cardiac standpoint. She has remained compliant with her medications, denying any intolerances. She notes some palpitations, though mostly under times of stress. She denies any prolonged spells. She denies any recent ER visits or hospitalizations. She offers no specific cardiovascular complaints or concerns today. She denies any complaints of chest pain, shortness of breath, lightheadedness, dizziness, palpitations, syncope, orthopnea, paroxysmal nocturnal dyspnea, lower extremity swelling or bleeding concerns.      Palpitations         Review of Systems   Cardiovascular:  Positive for palpitations.   All other systems reviewed and are negative.      Objective   Physical Exam  Constitutional:       Appearance: Healthy appearance. In no distress  Pulmonary:      Effort: Pulmonary effort is normal.      Breath sounds: Normal breath sounds.   Cardiovascular:      Normal rate. Regular rhythm. Normal S1. Normal S2.       Murmurs: There is no murmur.      Carotids: right carotid pulse +2, no bruit heard over the right carotid. left carotid pulse +2, no bruit heard over the left carotid.  Edema:     Peripheral edema absent.   Abdominal:      Palpations: Abdomen is soft.   Musculoskeletal:       Cervical back: Normal range of motion.   Skin:     General: Skin is warm and dry. Normal color and pigmentation   Neurological:      Mental Status: Alert and oriented to person, place and time.   Psychiatric:     Mood and Affect: appropriate mood and appropriate affect.     EKG obtained and reviewed. Normal sinus rhythm. HR 72      Lab Review:   Lab Results   Component Value Date     02/09/2024    K 4.3 02/09/2024     02/09/2024    CO2 27 02/09/2024    BUN 9 02/09/2024    CREATININE 0.80 02/09/2024    GLUCOSE 82 02/09/2024    CALCIUM 9.9 02/09/2024     Lab  Results   Component Value Date    WBC 4.1 (L) 02/09/2024    HGB 13.5 02/09/2024    HCT 38.5 02/09/2024    MCV 87 02/09/2024     02/09/2024     Lab Results   Component Value Date    CHOL 202 (H) 10/12/2023    TRIG 101 10/12/2023    HDL 59.9 10/12/2023       Assessment/Plan   Mrs. Ingram is a pleasant 44-year-old  female with a past medical history significant for palpitations. She also has lupus and myasthenia gravis and follows with neurology. Echocardiogram 3/2022 showed normal LV and RV systolic function with no significant valvular disease. NM stress test 9/2017 showed no ischemia. She had a Holter monitor 8/2020 showing normal sinus rhythm without significant arrhythmia. She presents today for routine follow up stable from a cardiac standpoint. Her VS and EKG remain stable. She seems to be getting around reasonably well, denying any exertional intolerances. I will have her continue all medications unchanged. We will not embark on any additional cardiovascular testing at this time. She will follow up with us in clinic in one year. She knows to call for any concerns.

## 2024-06-12 ENCOUNTER — APPOINTMENT (OUTPATIENT)
Dept: RHEUMATOLOGY | Facility: CLINIC | Age: 44
End: 2024-06-12
Payer: COMMERCIAL

## 2024-06-19 ENCOUNTER — OFFICE VISIT (OUTPATIENT)
Dept: RHEUMATOLOGY | Facility: CLINIC | Age: 44
End: 2024-06-19
Payer: COMMERCIAL

## 2024-06-19 ENCOUNTER — LAB (OUTPATIENT)
Dept: LAB | Facility: LAB | Age: 44
End: 2024-06-19
Payer: COMMERCIAL

## 2024-06-19 VITALS
BODY MASS INDEX: 23.13 KG/M2 | SYSTOLIC BLOOD PRESSURE: 112 MMHG | TEMPERATURE: 98.2 F | DIASTOLIC BLOOD PRESSURE: 81 MMHG | RESPIRATION RATE: 20 BRPM | HEART RATE: 84 BPM | WEIGHT: 139 LBS

## 2024-06-19 DIAGNOSIS — G70.00 MYASTHENIA GRAVIS (MULTI): ICD-10-CM

## 2024-06-19 DIAGNOSIS — M35.9 UNDIFFERENTIATED CONNECTIVE TISSUE DISEASE (MULTI): ICD-10-CM

## 2024-06-19 DIAGNOSIS — M35.9 UNDIFFERENTIATED CONNECTIVE TISSUE DISEASE (MULTI): Primary | ICD-10-CM

## 2024-06-19 LAB
B2 GLYCOPROT1 IGA SER-ACNC: <0.6 U/ML
B2 GLYCOPROT1 IGG SER-ACNC: <1.4 U/ML
B2 GLYCOPROT1 IGM SER-ACNC: 2.8 U/ML
BASOPHILS # BLD AUTO: 0.02 X10*3/UL (ref 0–0.1)
BASOPHILS NFR BLD AUTO: 0.4 %
CARDIOLIPIN IGA SERPL-ACNC: <0.5 APL U/ML
CARDIOLIPIN IGG SER IA-ACNC: <1.6 GPL U/ML
CARDIOLIPIN IGM SER IA-ACNC: 2.7 MPL U/ML
CRP SERPL-MCNC: <0.1 MG/DL
DSDNA AB SER-ACNC: 9 IU/ML
EOSINOPHIL # BLD AUTO: 0.21 X10*3/UL (ref 0–0.7)
EOSINOPHIL NFR BLD AUTO: 4.3 %
ERYTHROCYTE [DISTWIDTH] IN BLOOD BY AUTOMATED COUNT: 12.2 % (ref 11.5–14.5)
ERYTHROCYTE [SEDIMENTATION RATE] IN BLOOD BY WESTERGREN METHOD: 2 MM/H (ref 0–20)
HCT VFR BLD AUTO: 42.3 % (ref 36–46)
HGB BLD-MCNC: 14.3 G/DL (ref 12–16)
IMM GRANULOCYTES # BLD AUTO: 0.01 X10*3/UL (ref 0–0.7)
IMM GRANULOCYTES NFR BLD AUTO: 0.2 % (ref 0–0.9)
LYMPHOCYTES # BLD AUTO: 1.88 X10*3/UL (ref 1.2–4.8)
LYMPHOCYTES NFR BLD AUTO: 38.7 %
MCH RBC QN AUTO: 29.4 PG (ref 26–34)
MCHC RBC AUTO-ENTMCNC: 33.8 G/DL (ref 32–36)
MCV RBC AUTO: 87 FL (ref 80–100)
MONOCYTES # BLD AUTO: 0.47 X10*3/UL (ref 0.1–1)
MONOCYTES NFR BLD AUTO: 9.7 %
NEUTROPHILS # BLD AUTO: 2.27 X10*3/UL (ref 1.2–7.7)
NEUTROPHILS NFR BLD AUTO: 46.7 %
NRBC BLD-RTO: 0 /100 WBCS (ref 0–0)
PLATELET # BLD AUTO: 238 X10*3/UL (ref 150–450)
RBC # BLD AUTO: 4.87 X10*6/UL (ref 4–5.2)
WBC # BLD AUTO: 4.9 X10*3/UL (ref 4.4–11.3)

## 2024-06-19 PROCEDURE — 85652 RBC SED RATE AUTOMATED: CPT

## 2024-06-19 PROCEDURE — 86140 C-REACTIVE PROTEIN: CPT

## 2024-06-19 PROCEDURE — 85613 RUSSELL VIPER VENOM DILUTED: CPT

## 2024-06-19 PROCEDURE — 86225 DNA ANTIBODY NATIVE: CPT

## 2024-06-19 PROCEDURE — 86146 BETA-2 GLYCOPROTEIN ANTIBODY: CPT

## 2024-06-19 PROCEDURE — 99214 OFFICE O/P EST MOD 30 MIN: CPT | Performed by: INTERNAL MEDICINE

## 2024-06-19 PROCEDURE — 86147 CARDIOLIPIN ANTIBODY EA IG: CPT

## 2024-06-19 PROCEDURE — 85025 COMPLETE CBC W/AUTO DIFF WBC: CPT

## 2024-06-19 PROCEDURE — 36415 COLL VENOUS BLD VENIPUNCTURE: CPT

## 2024-06-19 ASSESSMENT — PAIN SCALES - GENERAL: PAINLEVEL: 3

## 2024-06-19 NOTE — PROGRESS NOTES
Subjective   Patient ID: Luisa Ingram is a 44 y.o. female who presents for Follow-up.    HPI  43 yo female with myasthenia gravis  In 2008, she was diagnosed with SLE based on arthralgia, myagia, Raynaud, photosensitivity, positive CAROLANN and low titer positive dsDNA  HCQ was started, but she did not tolerate HCQ  In 2011, she was diagnosed with M gravis, she had a tymectomy and currently, she is suing eculizumab, Soliris.  Her M gravis is in remission now  Today, She reports stiffness in her hands, but no joint pain.  USG showed inflammation in her right wrist  She is having Raynaud for many years and it is getting worse.  She also reports dry eyes and dry mouth  She denies oral ulcer, hair loss, fever    Interval history:  She reports mild joint pain in her hand, but denies swelling and AM stiffness  Her tests showed pos CAROLANN 1/320  Weak dsDNA 11  Neg GALLO  NL C3, c4 and normal urine  ROS  Joint pain in hands: negative   Joint swelling: negative  Morning stiffness and duration: negative   strength: normal  Oral ulcer: negative  Genital ulcer: negative  Raynaud phenomenon: negative  Chest pain/dyspnea: negative  Low back pain: negative  Visual problem: negative  Dry eyes/dry mouth: negative  Skin rash/scaling/psoriasis: negative       Objective     PEXAM  VS reviewed, WNL  General: Alert, no distress   HEENT: Normocephalic/atraumatic, No alopecia. PERRLA. Sclera white, conjunctiva pink, no malar rash. no oral or nasal ulcer. Oral cavity pink and moist, no erythema or exudate, dentition good.   Neck: supple  Respiratory: CTA B, no adventitious breath sounds  Cardiac: RRR, no murmurs, carotid, or bruits  Abdominal: symmetrical, soft, non-tender, non-distended, normoactive BSx4 quadrants, no CVA tenderness or suprapubic tenderness  MSK: Joints of upper and lower extremities were assessed for synovitis and ROM.    Today she has no evidence of synovitis in the joints of her hands or wrists, tender joint count 0,  swollen joint count 0   Extremities: no clubbing, no cyanosis, no edema  Skin: Skin warm and moist.   Neuro: non-focal, Strength 5/5 throughout. Normal gait. No cerebellar pathologic exam     Assessment/Plan   43 yo female with Myastenia gravis and remote h/o SLE  She is using eculizumab for MG which is remission now.  SLE dx was based on arthralgia, myalgia, photosensitivity, Raynaud, pos CAROLANN, did not tolerate HCQ in 2008.  Today, she reports joint pain, stiffness.  Recent USG showed some inflammation in her right wirst.  PExam showed mild tenderness in her right wrist and minor capillary changes in her nailfolds, but no sclerodactyli, tightness in her face.  Her CAROLANN pos (1/320), dsDNA weak pos 11, GALLO neg  I think she may have mild undifferentiated connective tissue disease or M. gravis related weak positive CAROLANN.  Her mother had DVT/PE and tests showed pos LA, but the patient has no h/o pregnancy complications or DVT.  -will see her APS abodies  -will see her in 8 months

## 2024-06-20 LAB
DRVVT SCREEN TO CONFIRM RATIO: 0.97 RATIO
DRVVT/DRVVT CFM NRMLZD PPP-RTO: 0.98 RATIO
DRVVT/DRVVT CFM P DOAC NEUT NORM PPP-RTO: 0.99 RATIO

## 2024-06-25 ENCOUNTER — TELEPHONE (OUTPATIENT)
Dept: PRIMARY CARE | Facility: CLINIC | Age: 44
End: 2024-06-25

## 2024-06-26 DIAGNOSIS — D49.89 MYASTHENIA GRAVIS ASSOCIATED WITH THYMOMA (MULTI): ICD-10-CM

## 2024-06-26 DIAGNOSIS — G70.00 MYASTHENIA GRAVIS ASSOCIATED WITH THYMOMA (MULTI): ICD-10-CM

## 2024-06-26 RX ORDER — LAMOTRIGINE 200 MG/1
200 TABLET ORAL 2 TIMES DAILY
Qty: 180 TABLET | Refills: 1 | Status: SHIPPED | OUTPATIENT
Start: 2024-06-26

## 2024-06-27 ENCOUNTER — APPOINTMENT (OUTPATIENT)
Dept: PRIMARY CARE | Facility: CLINIC | Age: 44
End: 2024-06-27
Payer: COMMERCIAL

## 2024-06-27 VITALS
BODY MASS INDEX: 23.16 KG/M2 | HEART RATE: 84 BPM | HEIGHT: 65 IN | DIASTOLIC BLOOD PRESSURE: 83 MMHG | WEIGHT: 139 LBS | SYSTOLIC BLOOD PRESSURE: 106 MMHG

## 2024-06-27 DIAGNOSIS — F32.A DEPRESSION, UNSPECIFIED DEPRESSION TYPE: ICD-10-CM

## 2024-06-27 DIAGNOSIS — K21.9 GERD WITHOUT ESOPHAGITIS: ICD-10-CM

## 2024-06-27 DIAGNOSIS — E03.9 ACQUIRED HYPOTHYROIDISM: ICD-10-CM

## 2024-06-27 DIAGNOSIS — D49.89 MYASTHENIA GRAVIS ASSOCIATED WITH THYMOMA (MULTI): ICD-10-CM

## 2024-06-27 DIAGNOSIS — I10 EPISODE OF HYPERTENSION: Primary | ICD-10-CM

## 2024-06-27 DIAGNOSIS — C37 MALIGNANT THYMOMA (MULTI): ICD-10-CM

## 2024-06-27 DIAGNOSIS — M32.9 SYSTEMIC LUPUS ERYTHEMATOSUS, UNSPECIFIED SLE TYPE, UNSPECIFIED ORGAN INVOLVEMENT STATUS (MULTI): ICD-10-CM

## 2024-06-27 DIAGNOSIS — G70.00 MYASTHENIA GRAVIS ASSOCIATED WITH THYMOMA (MULTI): ICD-10-CM

## 2024-06-27 DIAGNOSIS — E32.9 THYMUS DISORDER (MULTI): ICD-10-CM

## 2024-06-27 DIAGNOSIS — Z86.2 HISTORY OF ANEMIA: ICD-10-CM

## 2024-06-27 DIAGNOSIS — D70.2 OTHER DRUG-INDUCED NEUTROPENIA (CMS-HCC): ICD-10-CM

## 2024-06-27 DIAGNOSIS — G40.909 SEIZURE, EPILEPTIC (MULTI): ICD-10-CM

## 2024-06-27 PROBLEM — R56.9 SEIZURE (MULTI): Status: ACTIVE | Noted: 2024-06-27

## 2024-06-27 PROBLEM — R55 PRE-SYNCOPE: Status: ACTIVE | Noted: 2024-06-27
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
## 2024-06-27 PROBLEM — L08.9 NONVENOMOUS INSECT BITE WITH INFECTION: Status: ACTIVE | Noted: 2024-06-27

## 2024-06-27 PROCEDURE — 1036F TOBACCO NON-USER: CPT | Performed by: FAMILY MEDICINE

## 2024-06-27 PROCEDURE — 99214 OFFICE O/P EST MOD 30 MIN: CPT | Performed by: FAMILY MEDICINE

## 2024-06-27 PROCEDURE — G0439 PPPS, SUBSEQ VISIT: HCPCS | Performed by: FAMILY MEDICINE

## 2024-06-27 RX ORDER — CITALOPRAM 10 MG/1
10 TABLET ORAL DAILY
Qty: 90 TABLET | Refills: 1 | Status: SHIPPED | OUTPATIENT
Start: 2024-06-27

## 2024-06-27 ASSESSMENT — ENCOUNTER SYMPTOMS
SEIZURES: 1
DEPRESSION: 0
NERVOUS/ANXIOUS: 1
GASTROINTESTINAL NEGATIVE: 1
ARTHRALGIAS: 1
WEAKNESS: 1
CARDIOVASCULAR NEGATIVE: 1
CONSTITUTIONAL NEGATIVE: 1
LOSS OF SENSATION IN FEET: 0
RESPIRATORY NEGATIVE: 1
OCCASIONAL FEELINGS OF UNSTEADINESS: 0
MYALGIAS: 1

## 2024-06-27 ASSESSMENT — ACTIVITIES OF DAILY LIVING (ADL)
GROCERY_SHOPPING: INDEPENDENT
DOING_HOUSEWORK: INDEPENDENT
MANAGING_FINANCES: INDEPENDENT
BATHING: INDEPENDENT
DRESSING: INDEPENDENT
TAKING_MEDICATION: INDEPENDENT

## 2024-06-27 ASSESSMENT — PATIENT HEALTH QUESTIONNAIRE - PHQ9
1. LITTLE INTEREST OR PLEASURE IN DOING THINGS: NOT AT ALL
SUM OF ALL RESPONSES TO PHQ9 QUESTIONS 1 AND 2: 0
2. FEELING DOWN, DEPRESSED OR HOPELESS: NOT AT ALL

## 2024-06-27 NOTE — PROGRESS NOTES
Pt comes in for a cpe. Pt states her mom was just dx with lupus recently. Pt has no concerns today.

## 2024-06-27 NOTE — PROGRESS NOTES
"Subjective   Patient ID: Luisa Ingram is a 44 y.o. female who presents for No chief complaint on file..    HPI isnomnia, and depression , htn, sz disorder, myasthenia , lupus stable ccc andf u    Review of Systems   Constitutional: Negative.    HENT: Negative.     Respiratory: Negative.     Cardiovascular: Negative.    Gastrointestinal: Negative.    Musculoskeletal:  Positive for arthralgias and myalgias.   Neurological:  Positive for seizures and weakness.   Psychiatric/Behavioral:  The patient is nervous/anxious.        Objective   /83   Pulse 84   Ht 1.651 m (5' 5\")   Wt 63 kg (139 lb)   BMI 23.13 kg/m²     Physical Exam  Vitals reviewed.   Constitutional:       Appearance: Normal appearance. She is normal weight.   Eyes:      Extraocular Movements: Extraocular movements intact.      Conjunctiva/sclera: Conjunctivae normal.      Pupils: Pupils are equal, round, and reactive to light.   Cardiovascular:      Rate and Rhythm: Normal rate and regular rhythm.      Pulses: Normal pulses.      Heart sounds: Normal heart sounds.   Pulmonary:      Effort: Pulmonary effort is normal.      Breath sounds: Normal breath sounds.   Abdominal:      General: Bowel sounds are normal.      Palpations: Abdomen is soft.   Musculoskeletal:         General: Normal range of motion.   Skin:     General: Skin is warm and dry.   Neurological:      General: No focal deficit present.      Mental Status: She is alert and oriented to person, place, and time. Mental status is at baseline.      Motor: Weakness present.         Assessment/Plan   Problem List Items Addressed This Visit             ICD-10-CM    Depression F32.A    Episode of hypertension - Primary I10    GERD without esophagitis K21.9    History of anemia Z86.2    Hypothyroidism E03.9    Malignant thymoma (Multi) C37    Myasthenia gravis associated with thymoma (Multi) G70.00, D49.89    Neutropenia (CMS-HCC) D70.9    Seizure, epileptic (Multi) G40.909    Systemic " lupus erythematosus (Multi) M32.9    Thymus disorder (Multi) E32.9

## 2024-08-15 DIAGNOSIS — Z12.31 SCREENING MAMMOGRAM, ENCOUNTER FOR: Primary | ICD-10-CM

## 2024-09-04 ENCOUNTER — HOSPITAL ENCOUNTER (OUTPATIENT)
Dept: RADIOLOGY | Facility: CLINIC | Age: 44
Discharge: HOME | End: 2024-09-04
Payer: COMMERCIAL

## 2024-09-04 VITALS — BODY MASS INDEX: 23.14 KG/M2 | WEIGHT: 138.89 LBS | HEIGHT: 65 IN

## 2024-09-04 DIAGNOSIS — Z12.31 SCREENING MAMMOGRAM, ENCOUNTER FOR: ICD-10-CM

## 2024-09-04 PROCEDURE — 77067 SCR MAMMO BI INCL CAD: CPT | Performed by: RADIOLOGY

## 2024-09-04 PROCEDURE — 77063 BREAST TOMOSYNTHESIS BI: CPT | Performed by: RADIOLOGY

## 2024-09-04 PROCEDURE — 77067 SCR MAMMO BI INCL CAD: CPT

## 2024-09-09 ENCOUNTER — TELEPHONE (OUTPATIENT)
Dept: PRIMARY CARE | Facility: CLINIC | Age: 44
End: 2024-09-09
Payer: COMMERCIAL

## 2024-09-17 ENCOUNTER — OFFICE VISIT (OUTPATIENT)
Dept: URGENT CARE | Age: 44
End: 2024-09-17
Payer: COMMERCIAL

## 2024-09-17 VITALS
SYSTOLIC BLOOD PRESSURE: 123 MMHG | HEART RATE: 95 BPM | OXYGEN SATURATION: 98 % | TEMPERATURE: 97.7 F | DIASTOLIC BLOOD PRESSURE: 82 MMHG | RESPIRATION RATE: 18 BRPM

## 2024-09-17 DIAGNOSIS — J30.9 ALLERGIC SINUSITIS: Primary | ICD-10-CM

## 2024-09-17 RX ORDER — AZITHROMYCIN 250 MG/1
TABLET, FILM COATED ORAL
Qty: 6 TABLET | Refills: 0 | Status: SHIPPED | OUTPATIENT
Start: 2024-09-17 | End: 2024-09-22

## 2024-09-17 ASSESSMENT — ENCOUNTER SYMPTOMS
SORE THROAT: 1
SINUS PRESSURE: 1
COUGH: 1
APPETITE CHANGE: 1
SINUS PAIN: 1
CARDIOVASCULAR NEGATIVE: 1
ACTIVITY CHANGE: 1

## 2024-09-17 NOTE — PROGRESS NOTES
Subjective   Patient ID: Luisa Ingram is a 44 y.o. female. They present today with a chief complaint of Sinusitis (Productive cough/Covid home test resulted, negative).    History of Present Illness    A 44-year-old female arrives to clinic with chief complaint of concern for sinus infection.  The patient reports that she was outside doing a marathon for lupus when she noticed a lot of pollen in the air.  She states that she would have 3 sinus infections yearly.  She has not use any over-the-counter medications.  She is here for further evaluation and health maintenance.  Sinusitis  Associated symptoms: cough, ear pain and sore throat        Past Medical History  Allergies as of 09/17/2024 - Reviewed 09/17/2024   Allergen Reaction Noted    Peanut Hives 03/15/2023    Penicillins Hives 03/15/2023    Chloroquine Itching and Rash 03/15/2023    Sulfa (sulfonamide antibiotics) Rash and Unknown 03/15/2023       (Not in a hospital admission)       Past Medical History:   Diagnosis Date    Allergic     Anemia     Anxiety     Depression, unspecified 04/08/2019    Depression    Local infection of the skin and subcutaneous tissue, unspecified 11/15/2013    Nonvenomous insect bite with infection    Malignant neoplasm of thymus (Multi) 02/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    Thymoma, malignant    Myasthenia gravis with (acute) exacerbation (Multi) 04/08/2019    Myasthenia exacerbation    Other long term (current) drug therapy 03/23/2020    Long-term current use of intravenous immunoglobulin (IVIG)    Other specified postprocedural states 12/08/2019    History of vascular access device    Personal history of diseases of the blood and blood-forming organs and certain disorders involving the immune mechanism     History of anemia    Personal history of other specified conditions 10/11/2017    History of palpitations    Personal history of other specified conditions     History of blood loss    Unspecified convulsions (Multi)     Seizure       Past  Surgical History:   Procedure Laterality Date    ENDOMETRIAL ABLATION  03/21/2013    Gynecologic Services Thermal Endometrial Ablation    LAPAROSCOPY DIAGNOSTIC / BIOPSY / ASPIRATION / LYSIS  03/21/2013    Laparoscopy (Diagnostic)    OTHER SURGICAL HISTORY  03/14/2018    Excision Of Thymus    WISDOM TOOTH EXTRACTION  1999        reports that she has never smoked. She has never used smokeless tobacco. She reports that she does not currently use alcohol. She reports that she does not use drugs.    Review of Systems  Review of Systems   Constitutional:  Positive for activity change and appetite change.   HENT:  Positive for ear pain, postnasal drip, sinus pressure, sinus pain and sore throat.    Respiratory:  Positive for cough.    Cardiovascular: Negative.        Objective    Vitals:    09/17/24 0837   BP: 123/82   Pulse: 95   Resp: 18   Temp: 36.5 °C (97.7 °F)   SpO2: 98%     Patient's last menstrual period was 01/01/2009 (approximate).    Physical Exam  Vitals and nursing note reviewed.   Constitutional:       Appearance: Normal appearance.   HENT:      Head: Normocephalic and atraumatic.      Right Ear: Tympanic membrane normal.      Left Ear: Tympanic membrane normal.      Nose: Nose normal.      Mouth/Throat:      Mouth: Mucous membranes are moist.      Pharynx: Oropharynx is clear.   Eyes:      Extraocular Movements: Extraocular movements intact.      Conjunctiva/sclera: Conjunctivae normal.      Pupils: Pupils are equal, round, and reactive to light.   Cardiovascular:      Rate and Rhythm: Normal rate and regular rhythm.   Pulmonary:      Effort: Pulmonary effort is normal.      Breath sounds: Normal breath sounds.   Abdominal:      General: Bowel sounds are normal.      Palpations: Abdomen is soft.   Musculoskeletal:         General: Normal range of motion.      Cervical back: Normal range of motion and neck supple.   Skin:     General: Skin is warm.      Capillary Refill: Capillary refill takes less than 2  seconds.   Neurological:      General: No focal deficit present.      Mental Status: She is alert and oriented to person, place, and time. Mental status is at baseline.   Psychiatric:         Mood and Affect: Mood normal.         Behavior: Behavior normal.         Thought Content: Thought content normal.         Judgment: Judgment normal.         Procedures    Point of Care Test & Imaging Results from this visit  No results found for this visit on 09/17/24.   No results found.    Diagnostic study results (if any) were reviewed by JAYLA Mariscal.    Assessment/Plan   Allergies, medications, history, and pertinent labs/EKGs/Imaging reviewed by JAYLA Mariscal.     Medical Decision Making  S/s of allergic sinusitis. Begin OTZ claritin and flonase. Z-pack sent. If symptoms or persistent after antibx. Likely, viral. Pt agreess.    As a result of the work-up, the patient was discharged home.  she was informed of her diagnosis and instructed to come back with any concerns or worsening of condition.  she and was agreeable to the plan as discussed above.  she was given the opportunity to ask questions.  All of the patient's questions were answered.    This document was generated using the assistance of voice recognition software. If there are any errors of spelling, grammar, syntax, or meaning; please feel free to contact me directly for clarification.     Orders and Diagnoses  Diagnoses and all orders for this visit:  Allergic sinusitis  -     azithromycin (Zithromax) 250 mg tablet; Take 2 tabs (500 mg) by mouth today, than 1 daily for 4 days.      Medical Admin Record      Follow Up Instructions  No follow-ups on file.    Patient disposition: Home    Electronically signed by JAYLA Mariscal  8:46 AM

## 2024-10-07 DIAGNOSIS — R00.2 PALPITATIONS: ICD-10-CM

## 2024-10-07 RX ORDER — METOPROLOL SUCCINATE 25 MG/1
37.5 TABLET, EXTENDED RELEASE ORAL EVERY EVENING
Qty: 135 TABLET | Refills: 3 | Status: SHIPPED | OUTPATIENT
Start: 2024-10-07

## 2024-10-08 ENCOUNTER — APPOINTMENT (OUTPATIENT)
Dept: OBSTETRICS AND GYNECOLOGY | Facility: CLINIC | Age: 44
End: 2024-10-08
Payer: COMMERCIAL

## 2024-10-08 VITALS — DIASTOLIC BLOOD PRESSURE: 70 MMHG | SYSTOLIC BLOOD PRESSURE: 100 MMHG | BODY MASS INDEX: 24.21 KG/M2 | WEIGHT: 145.5 LBS

## 2024-10-08 DIAGNOSIS — Z12.31 ENCOUNTER FOR SCREENING MAMMOGRAM FOR MALIGNANT NEOPLASM OF BREAST: ICD-10-CM

## 2024-10-08 DIAGNOSIS — Z12.4 SCREENING FOR CERVICAL CANCER: ICD-10-CM

## 2024-10-08 DIAGNOSIS — Z01.419 ENCOUNTER FOR WELL WOMAN EXAM WITH ROUTINE GYNECOLOGICAL EXAM: Primary | ICD-10-CM

## 2024-10-08 DIAGNOSIS — Z11.51 SCREENING FOR HPV (HUMAN PAPILLOMAVIRUS): ICD-10-CM

## 2024-10-08 PROCEDURE — 87624 HPV HI-RISK TYP POOLED RSLT: CPT

## 2024-10-08 PROCEDURE — 99386 PREV VISIT NEW AGE 40-64: CPT | Performed by: NURSE PRACTITIONER

## 2024-10-08 NOTE — PROGRESS NOTES
"     HPI:   Luisa Ingram is a 44 y.o. who presents today for her annual gynecologic exam without complaints    She has the following concerns; Here to establish care. Pt has a hx of lupus.     GYN HISTORY:  She has a H/o uterine ablation d/t prolonged menstruation.  No regular periods post-ablation.  Occasional spotting during severe lupus flares  - Recent mammogram (last month) Category 1 negative, hx of dense breast tissue.     PAP History   Last pap: \"a long time ago\"  History of abnormal pap: yes - remote hx of abnormal PAP ~10 years ago.   HPV vaccine: no  @paphx@    Health Screening  Family history of breast, uterine, ovarian or colon cancer: no   Breast cancer: mammogram - Done in September 2024. Cat. 1.   Colon cancer: follows with GI.       The patient feels safe at home.         Review of Systems:   Constitutional: no fever and no chills.  Cardiovascular: no chest pain.   Respiratory: no shortness of breath.   Gastrointestinal: no nausea, no abdominal pain and no constipation  Genitourinary: no dysuria, no urinary incontinence, no vaginal dryness, no pelvic pain and no vaginal discharge.   Neurological: no headache.  Psychiatric: no anxiety and no depression.              Objective         /70   Wt 66 kg (145 lb 8.1 oz)   BMI 24.21 kg/m²         Physical Exam:   Constitutional: Alert and in no acute distress. Well developed, well nourished.      Neck: No neck asymmetry. Supple. Thyroid not enlarged and there were no palpable thyroid nodules.      Cardiovascular: Heart rate and rhythm were normal, normal S1 and S2, no gallops, and no murmurs.      Pulmonary: No respiratory distress. Clear bilateral breath sounds.      Chest: Breasts: Normal appearance, no nipple discharge and no skin changes. Palpation of breasts and axillae: No palpable mass and no axillary lymphadenopathy.      Abdomen: Soft nontender; no abdominal mass palpated. Normal bowel sounds. No organomegaly.      Genitourinary:   - " External genitalia: Normal.   - Palpation of lymph nodes in groin: No inguinal lymphadenopathy.   - Bartholin's Urethral and Skenes Glands: Normal.   - Urethra: Normal.    -Bladder: Normal on palpation.   - Vagina: Normal.   - Cervix: Normal.   - Uterus: Normal. Right Adnexa/parametria: Normal. Left Adnexa/parametria: Normal.   - Perianal Area: Normal.      Skin: Normal skin color and pigmentation, normal skin turgor, and no rash     Psychiatric: Alert and oriented x 3. Affect normal to patient baseline. Mood: Appropriate.            Assessment/Plan       Diagnoses and all orders for this visit:  Encounter for well woman exam with routine gynecological exam  Here for well woman exam and to establish care. PAP obtained. Discussed option of breast MRI for additional screening (self-pay) d/t dense breast tissue.   Encounter for screening mammogram for malignant neoplasm of breast  -     BI mammo bilateral screening tomosynthesis; Future  Screening for cervical cancer  -     THINPREP PAP TEST (>30)  -     HPV DNA High Risk With Genotype  Screening for HPV (human papillomavirus)  -     THINPREP PAP TEST (>30)  -     HPV DNA High Risk With Genotype  Follow-up annually; sooner if needed.       Shaye Zhong, SHABNAM-CNP

## 2024-10-10 ENCOUNTER — OFFICE VISIT (OUTPATIENT)
Dept: URGENT CARE | Age: 44
End: 2024-10-10
Payer: COMMERCIAL

## 2024-10-10 ENCOUNTER — ANCILLARY PROCEDURE (OUTPATIENT)
Dept: URGENT CARE | Age: 44
End: 2024-10-10
Payer: COMMERCIAL

## 2024-10-10 VITALS
HEART RATE: 94 BPM | RESPIRATION RATE: 16 BRPM | SYSTOLIC BLOOD PRESSURE: 112 MMHG | OXYGEN SATURATION: 99 % | DIASTOLIC BLOOD PRESSURE: 81 MMHG
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
## 2024-10-10 DIAGNOSIS — M25.561 ACUTE PAIN OF RIGHT KNEE: Primary | ICD-10-CM

## 2024-10-10 DIAGNOSIS — M25.561 ACUTE PAIN OF RIGHT KNEE: ICD-10-CM

## 2024-10-11 NOTE — PROGRESS NOTES
See scanned [facility] documents    Rest, ice, motrin, tylenol for pain, icy hot, biofreeze may be helpful    Pt instructed to follow up with occupational healthSubjective   Patient ID: Luisa Ingram is a 44 y.o. female. They present today with a chief complaint of Injury (WBC fell R knee ).    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  Painful to move.        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    Thymoma, malignant    Myasthenia gravis with (acute) exacerbation (Multi) 04/08/2019    Myasthenia exacerbation    Other long term (current) drug therapy 03/23/2020    Long-term current use of intravenous immunoglobulin (IVIG)    Other specified postprocedural states 12/08/2019    History of vascular access device    Personal history of diseases of the blood and blood-forming organs and certain disorders involving the immune mechanism     History of anemia    Personal history of other specified conditions 10/11/2017    History of palpitations    Personal history of other specified conditions     History of blood loss    Unspecified convulsions (Multi)     Seizure       Past Surgical History:   Procedure Laterality Date    ENDOMETRIAL ABLATION  03/21/2013     Gynecologic Services Thermal Endometrial Ablation    LAPAROSCOPY DIAGNOSTIC / BIOPSY / ASPIRATION / LYSIS  03/21/2013    Laparoscopy (Diagnostic)    OTHER SURGICAL HISTORY  03/14/2018    Excision Of Thymus    WISDOM TOOTH EXTRACTION  1999        reports that she has never smoked. She has never used smokeless tobacco. She reports that she does not currently use alcohol. She reports that she does not use drugs.    Review of Systems  Review of Systems                               Objective    Vitals:    10/10/24 1543   BP: 112/81   Pulse: 94   Resp: 16   SpO2: 99%     No LMP recorded. Patient has had an ablation.    Physical Exam  Musculoskeletal:      Right knee: Swelling and ecchymosis present. No erythema, lacerations, bony tenderness or crepitus. Normal range of motion. Tenderness present over the medial joint line. No LCL laxity, MCL laxity, ACL laxity or PCL laxity. Normal alignment, normal meniscus and normal patellar mobility. Normal pulse.      Instability Tests: Anterior drawer test negative. Posterior drawer test negative. Anterior Lachman test negative. Medial Pool test negative and lateral Pool test negative.        Legs:          Procedures    Point of Care Test & Imaging Results from this visit  No results found for this visit on 10/10/24.   XR knee right 3 views    Result Date: 10/10/2024  Interpreted By:  Nico Morse, STUDY: XR KNEE RIGHT 3 VIEWS; 10/10/2024 4:01 pm   INDICATION: Signs/Symptoms:fall. pain.   COMPARISON: None.   ACCESSION NUMBER(S): [id number]   ORDERING CLINICIAN: SANDY SORIANO   TECHNIQUE: Right knee two views   FINDINGS: No fractures or destructive lesions are identified. There is no evidence for an effusion. Joint spaces are maintained. There is no evidence for chondrocalcinosis.       No acute pathologic findings are identified.   MACRO: none   Signed by: Nico Morse 10/10/2024 4:30 PM Dictation workstation:   [id number]     Diagnostic study results (if any) were  reviewed by JAYLA Rivera.    Assessment/Plan   Allergies, medications, history, and pertinent labs/EKGs/Imaging reviewed by JAYLA Rivera.     Medical Decision Making  Sprain - MDM- History and examination consistent with ligament sprain. No evidence of  neurovascular compromise or bony injury at this time. Treatment is supportive measures and  PCP follow up. ED if worsens     Orders and Diagnoses  Diagnoses and all orders for this visit:  Acute pain of right knee  -     XR knee right 3 views; Future  Fall (on) (from) other stairs and steps, initial encounter  -     XR knee right 3 views; Future  Work related injury  -     XR knee right 3 views; Future      Medical Admin Record      Patient disposition: Home    Electronically signed by JAYLA Rivera  8:44 PM

## 2024-10-14 ENCOUNTER — APPOINTMENT (OUTPATIENT)
Dept: NEUROLOGY | Facility: CLINIC | Age: 44
End: 2024-10-14
Payer: COMMERCIAL

## 2024-10-15 PROBLEM — Z01.419 ENCOUNTER FOR WELL WOMAN EXAM WITH ROUTINE GYNECOLOGICAL EXAM: Status: ACTIVE | Noted: 2024-10-15

## 2024-10-17 ENCOUNTER — APPOINTMENT (OUTPATIENT)
Dept: PRIMARY CARE | Facility: CLINIC | Age: 44
End: 2024-10-17
Payer: COMMERCIAL

## 2024-10-17 VITALS
TEMPERATURE: 97.8 F | SYSTOLIC BLOOD PRESSURE: 110 MMHG | DIASTOLIC BLOOD PRESSURE: 70 MMHG | BODY MASS INDEX: 24.16 KG/M2 | HEIGHT: 65 IN | WEIGHT: 145 LBS | HEART RATE: 61 BPM | OXYGEN SATURATION: 100 %

## 2024-10-17 DIAGNOSIS — E88.89 OTHER SPECIFIED METABOLIC DISORDERS: Primary | ICD-10-CM

## 2024-10-17 DIAGNOSIS — E32.9 THYMUS DISORDER (MULTI): ICD-10-CM

## 2024-10-17 DIAGNOSIS — M32.9 SYSTEMIC LUPUS ERYTHEMATOSUS, UNSPECIFIED SLE TYPE, UNSPECIFIED ORGAN INVOLVEMENT STATUS (MULTI): ICD-10-CM

## 2024-10-17 DIAGNOSIS — Z91.010 PEANUT ALLERGY: ICD-10-CM

## 2024-10-17 PROBLEM — C93.00: Status: ACTIVE | Noted: 2024-10-17

## 2024-10-17 PROBLEM — C93.00: Status: RESOLVED | Noted: 2024-10-17 | Resolved: 2024-10-17

## 2024-10-17 PROBLEM — R07.9 CHEST PAIN: Status: RESOLVED | Noted: 2023-03-15 | Resolved: 2024-10-17

## 2024-10-17 PROCEDURE — 1036F TOBACCO NON-USER: CPT | Performed by: FAMILY MEDICINE

## 2024-10-17 PROCEDURE — 99214 OFFICE O/P EST MOD 30 MIN: CPT | Performed by: FAMILY MEDICINE

## 2024-10-17 PROCEDURE — 3008F BODY MASS INDEX DOCD: CPT | Performed by: FAMILY MEDICINE

## 2024-10-17 RX ORDER — CLOTRIMAZOLE AND BETAMETHASONE DIPROPIONATE 10; .64 MG/G; MG/G
1 CREAM TOPICAL 2 TIMES DAILY
COMMUNITY

## 2024-10-17 RX ORDER — FLUTICASONE PROPIONATE 50 MCG
1 SPRAY, SUSPENSION (ML) NASAL DAILY
COMMUNITY

## 2024-10-17 ASSESSMENT — ENCOUNTER SYMPTOMS
LOSS OF SENSATION IN FEET: 0
OCCASIONAL FEELINGS OF UNSTEADINESS: 0
DEPRESSION: 0

## 2024-10-17 NOTE — PROGRESS NOTES
"Subjective   Patient ID: Luisa Ingram is a 44 y.o. female who presents for Fatigue.    Patient presenting to establish care.  She is a 44-year-old female with past medical history    Needs allergies tested due to being a .     3sinus infections per year    2002 removal of thymus glands   Diagnosed w/ lupus after that   2011 diagnosed w/ myasthenia gravis     Mammogram 9/4/24  Pap: 10/8/24  Uterine ablation in 2009   Colonoscopy 11/15/19 start screening colonoscopy  at age 45   - Maternal Grandfather hx of colon cancer   - Paternal Grandmother hx of colon cancer     Objective   /70 (BP Location: Left arm, Patient Position: Sitting, BP Cuff Size: Adult)   Pulse 61   Temp 36.6 °C (97.8 °F) (Skin)   Ht 1.638 m (5' 4.5\")   Wt 65.8 kg (145 lb)   SpO2 100%   BMI 24.50 kg/m²     Physical Exam:     Constitutional:   General: not in acute distress  Appearance: normal appearance, non-toxic, not ill-appearing or diaphoretic.   HENT:   Head: Normocephalic and atraumatic  Eyes: EOMI, PERRL  CV: RRR, Normal Pulses, Normal Heart sounds  Pulm: CTAB, effort normal  Neuro: CN II-XII Intact, alert, oriented x3      Assessment/Plan     44-year-old female presenting to establish care past medical history significant for myasthenia gravis and lupus.  Currently she is stable and doing well.  She follows with rheumatology, cardiology,  neuromuscular neurology.  She is up-to-date on all routine screening exams.  She will be due for screening colonoscopy in April when she turns 45.  Discussed anticipatory guidance.  She also has a history of allergies and anaphylaxis to peanuts.  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  Referral placed to allergy immunology.    Problem List Items Addressed This Visit       Systemic lupus erythematosus " (Multi)    Thymus disorder (Multi)    Other specified metabolic disorders - Primary     Other Visit Diagnoses       Peanut allergy        Relevant Orders    Referral to Allergy                  DO JEFRY Jeffery spent a total of 34 minutes on the date of the service which included preparing to see the patient, face-to-face patient care, completing clinical documentation, performing a medically appropriate examination, counseling and educating the patient/family/caregiver and ordering medications, tests, or procedures.

## 2024-10-22 LAB
CYTOLOGY CMNT CVX/VAG CYTO-IMP: NORMAL
HPV HR 12 DNA GENITAL QL NAA+PROBE: NEGATIVE
HPV HR GENOTYPES PNL CVX NAA+PROBE: NEGATIVE
HPV16 DNA SPEC QL NAA+PROBE: NEGATIVE
HPV18 DNA SPEC QL NAA+PROBE: NEGATIVE
LAB AP HPV GENOTYPE QUESTION: YES
LAB AP HPV HR: NORMAL
LABORATORY COMMENT REPORT: NORMAL
PATH REPORT.TOTAL CANCER: NORMAL

## 2024-11-04 ENCOUNTER — OFFICE VISIT (OUTPATIENT)
Dept: NEUROLOGY | Facility: CLINIC | Age: 44
End: 2024-11-04
Payer: COMMERCIAL

## 2024-11-04 VITALS
BODY MASS INDEX: 24.24 KG/M2 | RESPIRATION RATE: 18 BRPM | DIASTOLIC BLOOD PRESSURE: 81 MMHG | WEIGHT: 142 LBS | HEIGHT: 64 IN | HEART RATE: 91 BPM | SYSTOLIC BLOOD PRESSURE: 114 MMHG

## 2024-11-04 DIAGNOSIS — G70.00 MYASTHENIA GRAVIS ASSOCIATED WITH THYMOMA (MULTI): Primary | ICD-10-CM

## 2024-11-04 DIAGNOSIS — D49.89 MYASTHENIA GRAVIS ASSOCIATED WITH THYMOMA (MULTI): Primary | ICD-10-CM

## 2024-11-04 PROCEDURE — 99214 OFFICE O/P EST MOD 30 MIN: CPT | Performed by: PSYCHIATRY & NEUROLOGY

## 2024-11-04 PROCEDURE — 3008F BODY MASS INDEX DOCD: CPT | Performed by: PSYCHIATRY & NEUROLOGY

## 2024-11-04 ASSESSMENT — PAIN SCALES - GENERAL: PAINLEVEL_OUTOF10: 0-NO PAIN

## 2024-11-05 NOTE — PROGRESS NOTES
Date of Service: 11/05/2024  Patient: Luisa Ingram  MRN: [id number]    Diagnosis:    Myasthenia gravis associated with thymoma       History of Present Illness:   Ms. Luisa Ingram is a 44 year old woman, whom is here today for a semi urgent unscheduled visit for myasthenia gravis AChR antibody positive and a possible exacerbation to her symptoms. She was last seen 4/22/2024.     She arrived today to the office unexpectedly, with concerns of a possible exacerbation to her MG symptoms.  She describes a heaviness to her legs that developed over the weekend following a 5 day delay in receiving her scheduled Soliris Infusion.  She has been stable with her myasthenia symptoms when she receives her infusions as scheduled.      During her previous appointment, she denied double vision, ptosis, shortness of breath, dysphagia, difficulty chewing or speaking, or any other weakness throughout.  She has not noticed any other symptoms but the leg heaviness at this time.  She is able to walk without assistance and denies any recent falls.    She continues to receive her Soliris through [facility] via [facility], and has been tolerating her dose well. She has not taken Mestinon since 2018 after suffering a great deal of GI distress and remains off of Prednisone.  Soliris is her only treatment at this time.     To Re-cap: She was s/p thymectomy due to thymoma in 2009 and diagnosed with seropositive myasthenia gravis in 2013. She had been resistant to therapy and had not tolerated immunosuppressive therapy because of side effects or neutropenia. She began receiving eculizumab (Soliris) since Spring of 2018 which she receives through her [facility] as a home infusion. Her IVIG was discontinued and received her last dose was in in November 2019, Prednisone was discontinued in July 2019, and has not taken Mestinon due to her abdominal discomfort and uncontrollable diarrhea. She denies any issues with her [facility] and tolerates  "this infusion without any noticeable side effects. Her last CT of chest was in 2017. She completed her meningitis vaccination required every 5 years in 2023 including both Menactra and Bexsero.     Otherwise, the past medical history, social history, and review of systems were reviewed. There are no significant changes.    /81   Pulse 91   Resp 18   Ht 1.626 m (5' 4\")   Wt 64.4 kg (142 lb)   BMI 24.37 kg/m²        Neuromuscular Exam:     The patient is in no apparent distress. Eyelids are normal with no ptosis despite 1 minute of sustained upgaze. Extraocular muscles are full with no subjective double vision. Eye closure strength is normal. Mouth closure strength is normal. Neck flexor and extensor strengths are normal. There is no dysarthria. Proximal muscle strength is normal.       Impression/Plan:    Ms/ Luisa Ingram has generalized seropositive myasthenia gravis and a history of thymoma status post thymectomy in 2002. She has also SLE.    She is now on eculizumab (Soliris) which was started in the spring of 2018 . She has tolerated it very well and has had minimal manifestations since. Her IVIG was ultimately discontinued in November 2019 and prednisone was discontinued in July 2019. Prior to this, she had been resistant to therapy and had not tolerated immunosuppressive therapy because of side effects or neutropenia. She also has had difficulty taking larger dose of Mestinon because of severe diarrhea and not taking any Mestinon for now.      She denies any issues with her [facility] and tolerates this infusion without any noticeable side effects. Her last CT of chest was in 2017.     She will continue of Soliris (eculizumab) at home with maintenance of 1200 mg every 2 weeks via [facility].  I discussed this with her. She will return to the office in 6 months. She will call for any questions.        Saray Ackerman M.D., F.A.C.P.   Director, [facility] & [facility] laboratory   [facility] " [place]   [facility]   Professor of Neurology   [employer] [facility] [employer]    You have Myasthenia gravis and below are the medications that should not be used (contraindicated).     1. Absolute contraindications (are life-threatening)  Curare   D-penicillamine  Botulinum toxin- Botox  Interferon alpha  2. Contraindications (should be avoided)  Antibiotics-  o        Aminoglycosides- Gentamycin, Kanamycin, Amikacin, Neomycin, Streptomycin,      Tobramycin, Netilmycin, Paromomycin, spectinomycin,      Vancomycin  o        Macrolides- Azithromycin (Z-pack), Erythromycin, Clarithromycin      (Biaxin), Telithromycin   o        Fluoroquinolones Ciprofloxacin (Cipro), Norfloxacin, Levofloxacin (Levaquin)  o        Tetracyclines Tetracycline, Doxycycline     Anti-malarials- Chloroquine, hydroxychloroquine (Plaquinal)  Anti-Fungals- Voriconazole  Anti-arrhythmics- Quinidine, Procainamide, Etafenone, Peruvoside  Magnesium- Oral tablets, IV magnesium replacement.     3. Use with Caution- may exacerbate weakness in some myasthenics  Antihypertensives  o        Calcium channel blockers- Verapamil, Nifedipine, Felodipine   o        Beta blockers- Propanalol, Atenolol, Acebutolol, Practolol, Oxprenolol, Sotalol,   Nadolol, and Ophthalmic Timolol  Lithium      I personally spent 35 minutes on the day of the visit completing the review of the medical record and outside records, obtaining history and performing an appropriate physical exam, patient care, counseling and education, placing orders, coordinating care and performing appropriate clinical documentation.

## 2024-12-03 ENCOUNTER — APPOINTMENT (OUTPATIENT)
Dept: NEUROLOGY | Facility: HOSPITAL | Age: 44
End: 2024-12-03
Payer: COMMERCIAL

## 2024-12-06 ENCOUNTER — HOSPITAL ENCOUNTER (EMERGENCY)
Facility: HOSPITAL | Age: 44
Discharge: HOME | End: 2024-12-06
Payer: COMMERCIAL

## 2024-12-06 ENCOUNTER — APPOINTMENT (OUTPATIENT)
Dept: RADIOLOGY | Facility: HOSPITAL | Age: 44
End: 2024-12-06
Payer: COMMERCIAL

## 2024-12-06 ENCOUNTER — APPOINTMENT (OUTPATIENT)
Dept: CARDIOLOGY | Facility: HOSPITAL | Age: 44
End: 2024-12-06
Payer: COMMERCIAL

## 2024-12-06 VITALS
TEMPERATURE: 98.4 F | HEART RATE: 81 BPM | SYSTOLIC BLOOD PRESSURE: 128 MMHG | RESPIRATION RATE: 18 BRPM | BODY MASS INDEX: 24.16 KG/M2 | OXYGEN SATURATION: 99 % | DIASTOLIC BLOOD PRESSURE: 79 MMHG | WEIGHT: 145 LBS | HEIGHT: 65 IN

## 2024-12-06 DIAGNOSIS — R06.02 SHORTNESS OF BREATH: Primary | ICD-10-CM

## 2024-12-06 LAB
ALBUMIN SERPL BCP-MCNC: 4.5 G/DL (ref 3.4–5)
ALP SERPL-CCNC: 39 U/L (ref 33–110)
ALT SERPL W P-5'-P-CCNC: 10 U/L (ref 7–45)
ANION GAP SERPL CALC-SCNC: 9 MMOL/L (ref 10–20)
AST SERPL W P-5'-P-CCNC: 13 U/L (ref 9–39)
BASOPHILS # BLD AUTO: 0.03 X10*3/UL (ref 0–0.1)
BASOPHILS NFR BLD AUTO: 0.6 %
BILIRUB SERPL-MCNC: 0.4 MG/DL (ref 0–1.2)
BNP SERPL-MCNC: 12 PG/ML (ref 0–99)
BUN SERPL-MCNC: 7 MG/DL (ref 6–23)
CALCIUM SERPL-MCNC: 9.1 MG/DL (ref 8.6–10.3)
CARDIAC TROPONIN I PNL SERPL HS: <3 NG/L (ref 0–13)
CARDIAC TROPONIN I PNL SERPL HS: <3 NG/L (ref 0–13)
CHLORIDE SERPL-SCNC: 104 MMOL/L (ref 98–107)
CO2 SERPL-SCNC: 27 MMOL/L (ref 21–32)
CREAT SERPL-MCNC: 0.78 MG/DL (ref 0.5–1.05)
D DIMER PPP FEU-MCNC: 415 NG/ML FEU
EGFRCR SERPLBLD CKD-EPI 2021: >90 ML/MIN/1.73M*2
EOSINOPHIL # BLD AUTO: 0.17 X10*3/UL (ref 0–0.7)
EOSINOPHIL NFR BLD AUTO: 3.3 %
ERYTHROCYTE [DISTWIDTH] IN BLOOD BY AUTOMATED COUNT: 12.1 % (ref 11.5–14.5)
GLUCOSE SERPL-MCNC: 84 MG/DL (ref 74–99)
HCG UR QL IA.RAPID: NEGATIVE
HCT VFR BLD AUTO: 37.9 % (ref 36–46)
HGB BLD-MCNC: 13.3 G/DL (ref 12–16)
IMM GRANULOCYTES # BLD AUTO: 0.02 X10*3/UL (ref 0–0.7)
IMM GRANULOCYTES NFR BLD AUTO: 0.4 % (ref 0–0.9)
LYMPHOCYTES # BLD AUTO: 1.92 X10*3/UL (ref 1.2–4.8)
LYMPHOCYTES NFR BLD AUTO: 36.9 %
MCH RBC QN AUTO: 29.6 PG (ref 26–34)
MCHC RBC AUTO-ENTMCNC: 35.1 G/DL (ref 32–36)
MCV RBC AUTO: 84 FL (ref 80–100)
MONOCYTES # BLD AUTO: 0.41 X10*3/UL (ref 0.1–1)
MONOCYTES NFR BLD AUTO: 7.9 %
NEUTROPHILS # BLD AUTO: 2.65 X10*3/UL (ref 1.2–7.7)
NEUTROPHILS NFR BLD AUTO: 50.9 %
NRBC BLD-RTO: 0 /100 WBCS (ref 0–0)
PLATELET # BLD AUTO: 239 X10*3/UL (ref 150–450)
POTASSIUM SERPL-SCNC: 3.2 MMOL/L (ref 3.5–5.3)
PROT SERPL-MCNC: 6.9 G/DL (ref 6.4–8.2)
RBC # BLD AUTO: 4.49 X10*6/UL (ref 4–5.2)
SODIUM SERPL-SCNC: 137 MMOL/L (ref 136–145)
WBC # BLD AUTO: 5.2 X10*3/UL (ref 4.4–11.3)

## 2024-12-06 PROCEDURE — 85379 FIBRIN DEGRADATION QUANT: CPT | Performed by: NURSE PRACTITIONER

## 2024-12-06 PROCEDURE — 83880 ASSAY OF NATRIURETIC PEPTIDE: CPT | Performed by: NURSE PRACTITIONER

## 2024-12-06 PROCEDURE — 84484 ASSAY OF TROPONIN QUANT: CPT | Performed by: NURSE PRACTITIONER

## 2024-12-06 PROCEDURE — 99285 EMERGENCY DEPT VISIT HI MDM: CPT | Mod: 25

## 2024-12-06 PROCEDURE — 93005 ELECTROCARDIOGRAM TRACING: CPT

## 2024-12-06 PROCEDURE — 71046 X-RAY EXAM CHEST 2 VIEWS: CPT | Mod: FOREIGN READ | Performed by: RADIOLOGY

## 2024-12-06 PROCEDURE — 36415 COLL VENOUS BLD VENIPUNCTURE: CPT | Performed by: NURSE PRACTITIONER

## 2024-12-06 PROCEDURE — 85025 COMPLETE CBC W/AUTO DIFF WBC: CPT | Performed by: NURSE PRACTITIONER

## 2024-12-06 PROCEDURE — 81025 URINE PREGNANCY TEST: CPT | Performed by: NURSE PRACTITIONER

## 2024-12-06 PROCEDURE — 71046 X-RAY EXAM CHEST 2 VIEWS: CPT

## 2024-12-06 PROCEDURE — 84075 ASSAY ALKALINE PHOSPHATASE: CPT | Performed by: NURSE PRACTITIONER

## 2024-12-06 ASSESSMENT — COLUMBIA-SUICIDE SEVERITY RATING SCALE - C-SSRS
2. HAVE YOU ACTUALLY HAD ANY THOUGHTS OF KILLING YOURSELF?: NO
1. IN THE PAST MONTH, HAVE YOU WISHED YOU WERE DEAD OR WISHED YOU COULD GO TO SLEEP AND NOT WAKE UP?: NO
6. HAVE YOU EVER DONE ANYTHING, STARTED TO DO ANYTHING, OR PREPARED TO DO ANYTHING TO END YOUR LIFE?: NO

## 2024-12-06 ASSESSMENT — HEART SCORE
HEART SCORE: 1
HISTORY: SLIGHTLY SUSPICIOUS
TROPONIN: LESS THAN OR EQUAL TO NORMAL LIMIT
ECG: NORMAL
AGE: <45
RISK FACTORS: 1-2 RISK FACTORS

## 2024-12-06 ASSESSMENT — PAIN - FUNCTIONAL ASSESSMENT: PAIN_FUNCTIONAL_ASSESSMENT: 0-10

## 2024-12-06 ASSESSMENT — PAIN SCALES - GENERAL: PAINLEVEL_OUTOF10: 0 - NO PAIN

## 2024-12-06 NOTE — ED PROVIDER NOTES
Chief Complaint   Patient presents with   • Shortness of Breath     Pt has shortness of breath.  Pt denies chest pain. Pt has hx of myesthenia gravis and is on soliris.        HPI       44 year old female presents to the Emergency Department today complaining of shortness of breath since last evening. Reports to having midsternal chest tightness that has been constant, non-radiating, and varies in intensity. Denies any associated fever, chills, headache, neck pain, URI symptoms including cough, abdominal pain, nausea, vomiting, diarrhea, constipation, or urinary symptoms.       History provided by:  Patient             Patient History   Past Medical History:   Diagnosis Date   • Allergic    • Anemia    • Anxiety    • Depression, unspecified 04/08/2019    Depression   • Local infection of the skin and subcutaneous tissue, unspecified 11/15/2013    Nonvenomous insect bite with infection   • Malignant neoplasm of thymus (Multi) 02/26/2018    Thymoma, malignant   • Myasthenia gravis with (acute) exacerbation (Multi) 04/08/2019    Myasthenia exacerbation   • Other long term (current) drug therapy 03/23/2020    Long-term current use of intravenous immunoglobulin (IVIG)   • Other specified postprocedural states 12/08/2019    History of vascular access device   • Personal history of diseases of the blood and blood-forming organs and certain disorders involving the immune mechanism     History of anemia   • Personal history of other specified conditions 10/11/2017    History of palpitations   • Personal history of other specified conditions     History of blood loss   • Unspecified convulsions (Multi)     Seizure     Past Surgical History:   Procedure Laterality Date   • ENDOMETRIAL ABLATION  03/21/2013    Gynecologic Services Thermal Endometrial Ablation   • LAPAROSCOPY DIAGNOSTIC / BIOPSY / ASPIRATION / LYSIS  03/21/2013    Laparoscopy (Diagnostic)   • OTHER SURGICAL HISTORY  03/14/2018    Excision Of Thymus   • WISDOM  TOOTH EXTRACTION  1999     Family History   Problem Relation Name Age of Onset   • Hypertension Mother Yolanda jacobsen    • Blood clot Mother Yolanda jacobsen    • Lupus Mother Yolanda jacobsen    • Other (Aneurysm Of Abdomina Aorta) Father Gerardo LIMA jacobsen Sr    • Other (Hypoglycemia) Father Gerardo LMIA jacobsen Sr    • Peripheral vascular disease Father Gerardo LIMA jacobsen Sr    • Blood clot Father Gerardo LIMA jacobsen Sr    • Cancer Father Gerardo LIMA jacobsen Sr    • Colon cancer Other grandparent      Social History     Tobacco Use   • Smoking status: Never     Passive exposure: Never   • Smokeless tobacco: Never   Vaping Use   • Vaping status: Never Used   Substance Use Topics   • Alcohol use: Not Currently   • Drug use: Never           Physical Exam  Constitutional:       Appearance: Normal appearance.   HENT:      Head: Normocephalic.      Right Ear: Tympanic membrane, ear canal and external ear normal.      Left Ear: Tympanic membrane, ear canal and external ear normal.      Nose: Nose normal.      Mouth/Throat:      Mouth: Mucous membranes are moist.      Pharynx: Oropharynx is clear. No oropharyngeal exudate or posterior oropharyngeal erythema.   Eyes:      Conjunctiva/sclera: Conjunctivae normal.      Pupils: Pupils are equal, round, and reactive to light.   Cardiovascular:      Rate and Rhythm: Normal rate and regular rhythm.      Pulses:           Radial pulses are 3+ on the right side and 3+ on the left side.        Dorsalis pedis pulses are 3+ on the right side and 3+ on the left side.      Heart sounds: Normal heart sounds. No murmur heard.     No friction rub. No gallop.   Pulmonary:      Effort: Pulmonary effort is normal. No respiratory distress.      Breath sounds: Normal breath sounds. No wheezing, rhonchi or rales.   Abdominal:      General: Abdomen is flat. Bowel sounds are normal.      Palpations: Abdomen is soft.      Tenderness: There is no abdominal tenderness. There is no right CVA tenderness, left CVA tenderness, guarding or  rebound. Negative signs include Collins's sign and McBurney's sign.   Musculoskeletal:         General: No swelling or deformity.      Cervical back: Full passive range of motion without pain.      Right lower leg: No edema.      Left lower leg: No edema.   Lymphadenopathy:      Cervical: No cervical adenopathy.   Skin:     Capillary Refill: Capillary refill takes less than 2 seconds.      Coloration: Skin is not jaundiced.      Findings: No rash.   Neurological:      General: No focal deficit present.      Mental Status: She is alert and oriented to person, place, and time. Mental status is at baseline.      Gait: Gait is intact.   Psychiatric:         Mood and Affect: Mood normal.         Behavior: Behavior is cooperative.         Labs Reviewed   COMPREHENSIVE METABOLIC PANEL - Abnormal       Result Value    Glucose 84      Sodium 137      Potassium 3.2 (*)     Chloride 104      Bicarbonate 27      Anion Gap 9 (*)     Urea Nitrogen 7      Creatinine 0.78      eGFR >90      Calcium 9.1      Albumin 4.5      Alkaline Phosphatase 39      Total Protein 6.9      AST 13      Bilirubin, Total 0.4      ALT 10     B-TYPE NATRIURETIC PEPTIDE - Normal    BNP 12      Narrative:        <100 pg/mL - Heart failure unlikely  100-299 pg/mL - Intermediate probability of acute heart                  failure exacerbation. Correlate with clinical                  context and patient history.    >=300 pg/mL - Heart Failure likely. Correlate with clinical                  context and patient history.    BNP testing is performed using different testing methodology at [facility] than at other [facility] hospitals. Direct result comparisons should only be made within the same method.      HCG, URINE, QUALITATIVE - Normal    HCG, Urine NEGATIVE     SERIAL TROPONIN-INITIAL - Normal    Troponin I, High Sensitivity <3      Narrative:     Less than 99th percentile of normal range cutoff-  Female and children under 18 years old <14  ng/L; Male <21 ng/L: Negative  Repeat testing should be performed if clinically indicated.     Female and children under 18 years old 14-50 ng/L; Male 21-50 ng/L:  Consistent with possible cardiac damage and possible increased clinical   risk. Serial measurements may help to assess extent of myocardial damage.     >50 ng/L: Consistent with cardiac damage, increased clinical risk and  myocardial infarction. Serial measurements may help assess extent of   myocardial damage.      NOTE: Children less than 1 year old may have higher baseline troponin   levels and results should be interpreted in conjunction with the overall   clinical context.     NOTE: Troponin I testing is performed using a different   testing methodology at [facility] than at other   [facility]. Direct result comparisons should only   be made within the same method.   SERIAL TROPONIN, 1 HOUR - Normal    Troponin I, High Sensitivity <3      Narrative:     Less than 99th percentile of normal range cutoff-  Female and children under 18 years old <14 ng/L; Male <21 ng/L: Negative  Repeat testing should be performed if clinically indicated.     Female and children under 18 years old 14-50 ng/L; Male 21-50 ng/L:  Consistent with possible cardiac damage and possible increased clinical   risk. Serial measurements may help to assess extent of myocardial damage.     >50 ng/L: Consistent with cardiac damage, increased clinical risk and  myocardial infarction. Serial measurements may help assess extent of   myocardial damage.      NOTE: Children less than 1 year old may have higher baseline troponin   levels and results should be interpreted in conjunction with the overall   clinical context.     NOTE: Troponin I testing is performed using a different   testing methodology at [facility] than at other   [facility]. Direct result comparisons should only   be made within the same method.   D-DIMER, VTE EXCLUSION - Normal     D-Dimer, Quantitative VTE Exclusion 415      Narrative:     The VTE Exclusion D-Dimer assay is reported in ng/mL Fibrinogen Equivalent Units (FEU).    Per 's instructions for use, a value of less than 500 ng/mL (FEU) may help to exclude DVT or PE in outpatients when the assay is used with a clinical pretest probability assessment.(AEMR must utilize and document eCalc 'Wells Score Deep Vein Thrombosis Risk' for DVT exclusion only. Emergency Department should utilize  Guidelines for Emergency Department Use of the VTE Exclusion D-Dimer and Clinical Pretest probability assessment model for DVT or PE exclusion.)   CBC WITH AUTO DIFFERENTIAL    WBC 5.2      nRBC 0.0      RBC 4.49      Hemoglobin 13.3      Hematocrit 37.9      MCV 84      MCH 29.6      MCHC 35.1      RDW 12.1      Platelets 239      Neutrophils % 50.9      Immature Granulocytes %, Automated 0.4      Lymphocytes % 36.9      Monocytes % 7.9      Eosinophils % 3.3      Basophils % 0.6      Neutrophils Absolute 2.65      Immature Granulocytes Absolute, Automated 0.02      Lymphocytes Absolute 1.92      Monocytes Absolute 0.41      Eosinophils Absolute 0.17      Basophils Absolute 0.03     TROPONIN SERIES- (INITIAL, 1 HR)    Narrative:     The following orders were created for panel order Troponin I Series, High Sensitivity (0, 1 HR).  Procedure                               Abnormality         Status                     ---------                               -----------         ------                     Troponin I, High Sensiti...[[id number]]  Normal              Final result               Troponin, High Sensitivi...[[id number]]  Normal              Final result                 Please view results for these tests on the individual orders.       XR chest 2 views   Final Result   No radiographic evidence of acute cardiopulmonary disease.   Signed by Nicolas Limon DO               ED Course & MDM   Diagnoses as of 12/06/24 1933   Shortness of  breath           Medical Decision Making  EKG interpreted by Dr. Garza. Indication: chest tightness. Findings: NSR with a ventricular rate of 83, normal axis, normal intervals, and no acute ischemic or injury pattern. Impression: No acute pathology.       Patient was seen and evaluated by Dr. Torres. Saline lock was established with labs drawn and results as above. Blood counts, electrolytes, kidney function, and liver function were unremarkable. Heart Score- 1 with normal EKG and troponin with delta troponin. At this time, we feel that the chest pain is atypical for acute coronary syndrome. We find no underlying evidence of an acute infectious process or pneumothorax on CXR. Clinically, we do not feel they are exhibiting signs of pulmonary embolism or thoracic aortic dissection (PERC negative, normal D-dimer, no connective tissue disorder, no tachycardia, tachypnea, hypoxia, and mediastinum normal in size on CXR). Normal cardiac BNP without evidence of CHF on CXR. NIF test performed and normal. We are unclear as to the direct etiology of her dyspnea. Follow up with their doctor in 3 days. Return if worse in any way. Discharged in stable condition with computer instructions.    Diagnostic Impression:     1. Acute dyspnea           Your medication list        ASK your doctor about these medications        Instructions Last Dose Given Next Dose Due   cholecalciferol 25 MCG (1000 UT) capsule  Commonly known as: Vitamin D-3           citalopram 10 mg tablet  Commonly known as: CeleXA      Take 1 tablet (10 mg) by mouth once daily.       clotrimazole-betamethasone cream  Commonly known as: Lotrisone           diphenhydrAMINE 25 mg tablet  Commonly known as: Sominex           ferrous sulfate (325 mg ferrous sulfate) tablet           fluticasone 50 mcg/actuation nasal spray  Commonly known as: Flonase           folic acid 800 mcg tablet  Commonly known as: Folvite           heparin 1,000 unit/mL injection            lamoTRIgine 200 mg tablet  Commonly known as: LaMICtal      Take 1 tablet (200 mg) by mouth 2 times a day.       lidocaine-prilocaine 2.5-2.5 % cream  Commonly known as: Emla           loperamide 2 mg tablet  Commonly known as: Imodium A-D           metoprolol succinate XL 25 mg 24 hr tablet  Commonly known as: Toprol-XL      Take 1.5 tablets (37.5 mg) by mouth once daily in the evening.       Soliris 300 mg/30 mL solution injection  Generic drug: eculizumab                      Procedure  Procedures     Ramiro Martin, SHABNAM-NICOLE  12/06/24 1933

## 2024-12-10 ENCOUNTER — APPOINTMENT (OUTPATIENT)
Dept: ALLERGY | Facility: CLINIC | Age: 44
End: 2024-12-10

## 2024-12-12 ENCOUNTER — OFFICE VISIT (OUTPATIENT)
Dept: PRIMARY CARE | Facility: CLINIC | Age: 44
End: 2024-12-12
Payer: COMMERCIAL

## 2024-12-12 VITALS
TEMPERATURE: 97.6 F | WEIGHT: 146.6 LBS | DIASTOLIC BLOOD PRESSURE: 74 MMHG | HEART RATE: 82 BPM | BODY MASS INDEX: 24.4 KG/M2 | OXYGEN SATURATION: 98 % | SYSTOLIC BLOOD PRESSURE: 110 MMHG

## 2024-12-12 DIAGNOSIS — R06.02 SHORTNESS OF BREATH: Primary | ICD-10-CM

## 2024-12-12 PROCEDURE — 1036F TOBACCO NON-USER: CPT | Performed by: FAMILY MEDICINE

## 2024-12-12 PROCEDURE — 99214 OFFICE O/P EST MOD 30 MIN: CPT | Performed by: FAMILY MEDICINE

## 2024-12-12 RX ORDER — ALBUTEROL SULFATE 0.83 MG/ML
3 SOLUTION RESPIRATORY (INHALATION) ONCE
OUTPATIENT
Start: 2024-12-12 | End: 2024-12-12

## 2024-12-12 RX ORDER — ALBUTEROL SULFATE 90 UG/1
1 INHALANT RESPIRATORY (INHALATION) ONCE
OUTPATIENT
Start: 2024-12-12

## 2024-12-12 NOTE — PROGRESS NOTES
Subjective   Patient ID: Luisa Ingram is a 44 y.o. female who presents for ER Follow Up for SOB.    Last Wednesday she finished making dinner.   She put the door down for the chicken coop. When she came back in she was winded. She had to take deep breaths.   Thusrday shehad her treatment. Her lungs felt good.   Friday drivinghome from work; she couldn'tbreathe and felt like it was getting worse.  She called her muscle doctor who told her to call EMS.  Wentto ER and had full work up   Pulse ox was normal.  CXR normal   Respiratory therapist evaluated her and she has been using a spirometer.     No fevers  No coughign     Objective   /74 (BP Location: Left arm, Patient Position: Sitting, BP Cuff Size: Adult)   Pulse 82   Temp 36.4 °C (97.6 °F) (Skin)   Wt 66.5 kg (146 lb 9.6 oz)   SpO2 98%   BMI 24.40 kg/m²     Physical Exam:     Constitutional:   General: not in acute distress  Appearance: normal appearance, non-toxic, not ill-appearing or diaphoretic.   HENT:   Head: Normocephalic and atraumatic  Eyes: EOMI, PERRL  CV: RRR, Normal Pulses, Normal Heart sounds  Pulm: CTAB, effort normal  Neuro: CN II-XII Intact, alert, oriented x3      Assessment/Plan     Unclear etiology of shortness of breath.  However due to her significant medical history will evaluate with.  With her history of myasthenia gravis and want to ensure that diaphragm function is intact.  On physical exam she is breathing comfortably without any distress.  O2 saturations are 97 to 99%.  Lung exam is clear throughout.    Problem List Items Addressed This Visit    None  Visit Diagnoses       Shortness of breath    -  Primary    Relevant Orders    Complete Pulmonary Function Test Pre/Post Bronchodilator (Spirometry Pre/Post/DLCO/Lung Volumes)                  Josie Simmons DO     I spent a total of 18 minutes on the date of the service which included preparing to see the patient, face-to-face patient care, completing clinical  documentation, performing a medically appropriate examination, counseling and educating the patient/family/caregiver and ordering medications, tests, or procedures.

## 2024-12-15 LAB
ATRIAL RATE: 227 BPM
P AXIS: 0 DEGREES
PR INTERVAL: 185 MS
Q ONSET: 249 MS
QRS COUNT: 13 BEATS
QRS DURATION: 93 MS
QT INTERVAL: 386 MS
QTC CALCULATION(BAZETT): 454 MS
QTC FREDERICIA: 430 MS
R AXIS: 40 DEGREES
T AXIS: 29 DEGREES
T OFFSET: 442 MS
VENTRICULAR RATE: 83 BPM

## 2024-12-23 ENCOUNTER — HOSPITAL ENCOUNTER (OUTPATIENT)
Dept: RESPIRATORY THERAPY | Facility: CLINIC | Age: 44
Discharge: HOME | End: 2024-12-23
Payer: COMMERCIAL

## 2024-12-23 DIAGNOSIS — R06.02 SHORTNESS OF BREATH: ICD-10-CM

## 2024-12-23 PROCEDURE — 94010 BREATHING CAPACITY TEST: CPT

## 2024-12-23 PROCEDURE — 94729 DIFFUSING CAPACITY: CPT

## 2024-12-23 PROCEDURE — 94727 GAS DIL/WSHOT DETER LNG VOL: CPT

## 2025-01-06 DIAGNOSIS — G70.00 MYASTHENIA GRAVIS ASSOCIATED WITH THYMOMA (MULTI): ICD-10-CM

## 2025-01-06 DIAGNOSIS — D49.89 MYASTHENIA GRAVIS ASSOCIATED WITH THYMOMA (MULTI): ICD-10-CM

## 2025-01-06 RX ORDER — LAMOTRIGINE 200 MG/1
200 TABLET ORAL 2 TIMES DAILY
Qty: 180 TABLET | Refills: 1 | Status: SHIPPED | OUTPATIENT
Start: 2025-01-06

## 2025-01-08 ENCOUNTER — PATIENT MESSAGE (OUTPATIENT)
Dept: PRIMARY CARE | Facility: CLINIC | Age: 45
End: 2025-01-08
Payer: COMMERCIAL

## 2025-01-08 DIAGNOSIS — R06.02 SHORTNESS OF BREATH: Primary | ICD-10-CM

## 2025-01-08 DIAGNOSIS — R06.09 DYSPNEA ON EXERTION: ICD-10-CM

## 2025-01-08 DIAGNOSIS — G70.00 MYASTHENIA GRAVIS ASSOCIATED WITH THYMOMA (MULTI): ICD-10-CM

## 2025-01-08 DIAGNOSIS — D49.89 MYASTHENIA GRAVIS ASSOCIATED WITH THYMOMA (MULTI): ICD-10-CM

## 2025-01-12 ENCOUNTER — OFFICE VISIT (OUTPATIENT)
Dept: URGENT CARE | Age: 45
End: 2025-01-12
Payer: COMMERCIAL

## 2025-01-12 VITALS
RESPIRATION RATE: 20 BRPM | HEART RATE: 92 BPM | OXYGEN SATURATION: 99 % | TEMPERATURE: 98.4 F | DIASTOLIC BLOOD PRESSURE: 81 MMHG | SYSTOLIC BLOOD PRESSURE: 115 MMHG

## 2025-01-12 DIAGNOSIS — H60.392 OTHER INFECTIVE ACUTE OTITIS EXTERNA OF LEFT EAR: ICD-10-CM

## 2025-01-12 DIAGNOSIS — R68.89 FLU-LIKE SYMPTOMS: ICD-10-CM

## 2025-01-12 DIAGNOSIS — J06.9 VIRAL URI: Primary | ICD-10-CM

## 2025-01-12 LAB
POC RAPID INFLUENZA A: NEGATIVE
POC RAPID INFLUENZA B: NEGATIVE

## 2025-01-12 PROCEDURE — 99213 OFFICE O/P EST LOW 20 MIN: CPT | Performed by: NURSE PRACTITIONER

## 2025-01-12 PROCEDURE — 87804 INFLUENZA ASSAY W/OPTIC: CPT | Performed by: NURSE PRACTITIONER

## 2025-01-12 RX ORDER — CIPROFLOXACIN AND DEXAMETHASONE 3; 1 MG/ML; MG/ML
4 SUSPENSION/ DROPS AURICULAR (OTIC) 2 TIMES DAILY
Qty: 7.5 ML | Refills: 0 | Status: SHIPPED | OUTPATIENT
Start: 2025-01-12 | End: 2025-01-19

## 2025-01-12 RX ORDER — FLUTICASONE PROPIONATE 50 MCG
1 SPRAY, SUSPENSION (ML) NASAL DAILY
Qty: 16 G | Refills: 0 | Status: SHIPPED | OUTPATIENT
Start: 2025-01-12

## 2025-01-12 NOTE — PATIENT INSTRUCTIONS
signs and symptoms consistent with acute otitis externa. No evidence of  AOM. No evidence of mastoiditis. Patient given antibiotic drops.   History and examination consistent with viral  illness - no indication for further imaging or other antibiotics. No evidence of sepsis, strep, pneumonia, otitis, bacterial sinusitis.  Patient counseled on  supportive measures at home.   Rest, hydration, OTC Sinus/Cold, Vicks vaporub, vaporizer in bedroom at night, tylenol/motrin for pain/fever  Patient is encouraged to return to clinic if symptoms change or  worsen and will otherwise follow with PCP.

## 2025-01-12 NOTE — PROGRESS NOTES
Subjective   Patient ID: Luisa Ingram is a 44 y.o. female. They present today with a chief complaint of Sinus Problem (Pt advised that she woke up today with a headache, post nasal drip and left earache. ). No fever, chills, SOB, wheeze or sorethroat.  Not taking anything otc        Past Medical History  Allergies as of 01/12/2025 - Reviewed 01/12/2025   Allergen Reaction Noted    Peanut Hives 03/15/2023    Penicillins Hives 03/15/2023    Chloroquine Itching and Rash 03/15/2023    Sulfa (sulfonamide antibiotics) Rash and Unknown 03/15/2023       (Not in a hospital admission)       Past Medical History:   Diagnosis Date    Allergic     Anemia     Anxiety     Depression, unspecified 04/08/2019    Depression    Local infection of the skin and subcutaneous tissue, unspecified 11/15/2013    Nonvenomous insect bite with infection    Malignant neoplasm of thymus (Multi) 02/26/2018    Thymoma, malignant    Myasthenia gravis with (acute) exacerbation (Multi) 04/08/2019    Myasthenia exacerbation    Other long term (current) drug therapy 03/23/2020    Long-term current use of intravenous immunoglobulin (IVIG)    Other specified postprocedural states 12/08/2019    History of vascular access device    Personal history of diseases of the blood and blood-forming organs and certain disorders involving the immune mechanism     History of anemia    Personal history of other specified conditions 10/11/2017    History of palpitations    Personal history of other specified conditions     History of blood loss    Unspecified convulsions (Multi)     Seizure       Past Surgical History:   Procedure Laterality Date    ENDOMETRIAL ABLATION  03/21/2013    Gynecologic Services Thermal Endometrial Ablation    LAPAROSCOPY DIAGNOSTIC / BIOPSY / ASPIRATION / LYSIS  03/21/2013    Laparoscopy (Diagnostic)    OTHER SURGICAL HISTORY  03/14/2018    Excision Of Thymus    WISDOM TOOTH EXTRACTION  1999        reports that she has never smoked. She  has never been exposed to tobacco smoke. She has never used smokeless tobacco. She reports that she does not currently use alcohol. She reports that she does not use drugs.    Review of Systems  Review of Systems                               Objective    Vitals:    01/12/25 1624   BP: 115/81   Pulse: 92   Resp: 20   Temp: 36.9 °C (98.4 °F)   SpO2: 99%     No LMP recorded. Patient has had an ablation.    Physical Exam  Constitutional:       Appearance: She is ill-appearing.   HENT:      Ears:      Comments: BilatTM bulging, cloudy effusion, left canal erythematous.     Nose: Congestion present.   Eyes:      Extraocular Movements: Extraocular movements intact.      Pupils: Pupils are equal, round, and reactive to light.   Cardiovascular:      Rate and Rhythm: Normal rate and regular rhythm.      Pulses: Normal pulses.      Heart sounds: Normal heart sounds.   Pulmonary:      Effort: Pulmonary effort is normal.      Breath sounds: Normal breath sounds.   Musculoskeletal:         General: Normal range of motion.      Cervical back: Normal range of motion and neck supple.   Skin:     General: Skin is warm and dry.      Capillary Refill: Capillary refill takes less than 2 seconds.   Neurological:      General: No focal deficit present.      Mental Status: She is alert and oriented to person, place, and time.   Psychiatric:         Mood and Affect: Mood normal.         Behavior: Behavior normal.         Thought Content: Thought content normal.         Judgment: Judgment normal.         Procedures    Point of Care Test & Imaging Results from this visit  Results for orders placed or performed in visit on 01/12/25   POCT Influenza A/B manually resulted   Result Value Ref Range    POC Rapid Influenza A Negative Negative    POC Rapid Influenza B Negative Negative      No results found.    Diagnostic study results (if any) were reviewed by SHABNAM Rivera-NICOLE.    Assessment/Plan   Allergies, medications, history, and  pertinent labs/EKGs/Imaging reviewed by JAYLA Rivera.     Medical Decision Making  signs and symptoms consistent with acute otitis externa. No evidence of  AOM. No evidence of mastoiditis. Patient given antibiotic drops.   History and examination consistent with viral  illness - no indication for further imaging or other antibiotics. No evidence of sepsis, strep, pneumonia, otitis, bacterial sinusitis.  Patient counseled on  supportive measures at home.   Rest, hydration, OTC Sinus/Cold, Vicks vaporub, vaporizer in bedroom at night, tylenol/motrin for pain/fever  Patient is encouraged to return to clinic if symptoms change or  worsen and will otherwise follow with PCP.     Orders and Diagnoses  Diagnoses and all orders for this visit:  Viral URI  -     fluticasone (Flonase) 50 mcg/actuation nasal spray; Administer 1 spray into each nostril once daily. Shake gently. Before first use, prime pump. After use, clean tip and replace cap.  Flu-like symptoms  -     POCT Influenza A/B manually resulted  Other infective acute otitis externa of left ear  -     ciprofloxacin-dexamethasone (CiproDEX) otic suspension; Administer 4 drops into affected ear(s) 2 times a day for 7 days.      Medical Admin Record      Patient disposition: Home    Electronically signed by JAYLA Rivera  4:40 PM

## 2025-01-14 ENCOUNTER — OFFICE VISIT (OUTPATIENT)
Dept: PRIMARY CARE | Facility: CLINIC | Age: 45
End: 2025-01-14
Payer: COMMERCIAL

## 2025-01-14 VITALS
SYSTOLIC BLOOD PRESSURE: 110 MMHG | WEIGHT: 146.8 LBS | HEART RATE: 96 BPM | OXYGEN SATURATION: 99 % | DIASTOLIC BLOOD PRESSURE: 80 MMHG | BODY MASS INDEX: 24.46 KG/M2 | HEIGHT: 65 IN | TEMPERATURE: 97.7 F

## 2025-01-14 DIAGNOSIS — J01.90 ACUTE SINUSITIS, RECURRENCE NOT SPECIFIED, UNSPECIFIED LOCATION: Primary | ICD-10-CM

## 2025-01-14 PROCEDURE — G2211 COMPLEX E/M VISIT ADD ON: HCPCS | Performed by: FAMILY MEDICINE

## 2025-01-14 PROCEDURE — 99213 OFFICE O/P EST LOW 20 MIN: CPT | Performed by: FAMILY MEDICINE

## 2025-01-14 PROCEDURE — 3008F BODY MASS INDEX DOCD: CPT | Performed by: FAMILY MEDICINE

## 2025-01-14 RX ORDER — DOXYCYCLINE 100 MG/1
100 CAPSULE ORAL 2 TIMES DAILY
Qty: 20 CAPSULE | Refills: 0 | Status: SHIPPED | OUTPATIENT
Start: 2025-01-14 | End: 2025-01-24

## 2025-01-14 ASSESSMENT — ENCOUNTER SYMPTOMS
OCCASIONAL FEELINGS OF UNSTEADINESS: 0
DEPRESSION: 0

## 2025-01-14 NOTE — PROGRESS NOTES
"Assessment/Plan         Problem List Items Addressed This Visit    None  Visit Diagnoses       Acute sinusitis, recurrence not specified, unspecified location    -  Primary    Relevant Medications    doxycycline (Vibramycin) 100 mg capsule                  Subjective   Patient ID: Luisa Ingram is a 44 y.o. female who presents for Follow-up (Urgent care visit 01/12/25 record in chart), Earache (Left ear), and sinus congestion  (Drainage noted ).    Left ear   Diagnosed w/ ETD   Woke up Sunday w// symptoms     Objective   /80 (BP Location: Left arm, Patient Position: Sitting, BP Cuff Size: Adult)   Pulse 96   Temp 36.5 °C (97.7 °F) (Temporal)   Ht 1.66 m (5' 5.35\")   Wt 66.6 kg (146 lb 12.8 oz)   SpO2 99%   BMI 24.16 kg/m²     Physical Exam:     Constitutional:   General: not in acute distress  Appearance: normal appearance, non-toxic, not ill-appearing or diaphoretic.   HENT:   Head: Normocephalic and atraumatic  Eyes: EOMI, PERRL  CV: RRR, Normal Pulses, Normal Heart sounds  Pulm: CTAB, effort normal  Neuro: CN II-XII Intact, alert, oriented x3          DO JEFRY Jeffery spent a total of 10 minutes on the date of the service which included preparing to see the patient, face-to-face patient care, completing clinical documentation, performing a medically appropriate examination, counseling and educating the patient/family/caregiver and ordering medications, tests, or procedures.   "

## 2025-01-23 DIAGNOSIS — R06.02 SHORTNESS OF BREATH: ICD-10-CM

## 2025-01-24 ENCOUNTER — APPOINTMENT (OUTPATIENT)
Dept: RHEUMATOLOGY | Facility: CLINIC | Age: 45
End: 2025-01-24
Payer: COMMERCIAL

## 2025-01-24 ENCOUNTER — LAB (OUTPATIENT)
Dept: LAB | Facility: LAB | Age: 45
End: 2025-01-24
Payer: COMMERCIAL

## 2025-01-24 VITALS
DIASTOLIC BLOOD PRESSURE: 77 MMHG | HEIGHT: 65 IN | BODY MASS INDEX: 25.26 KG/M2 | TEMPERATURE: 98.4 F | WEIGHT: 151.6 LBS | HEART RATE: 94 BPM | SYSTOLIC BLOOD PRESSURE: 127 MMHG

## 2025-01-24 DIAGNOSIS — R06.02 SHORTNESS OF BREATH: ICD-10-CM

## 2025-01-24 DIAGNOSIS — G70.00 MYASTHENIA GRAVIS: ICD-10-CM

## 2025-01-24 DIAGNOSIS — M35.9 UNDIFFERENTIATED CONNECTIVE TISSUE DISEASE (MULTI): ICD-10-CM

## 2025-01-24 DIAGNOSIS — M35.9 UNDIFFERENTIATED CONNECTIVE TISSUE DISEASE (MULTI): Primary | ICD-10-CM

## 2025-01-24 LAB
ALBUMIN SERPL BCP-MCNC: 4.6 G/DL (ref 3.4–5)
ALP SERPL-CCNC: 48 U/L (ref 33–110)
ALT SERPL W P-5'-P-CCNC: 14 U/L (ref 7–45)
ANION GAP SERPL CALC-SCNC: 15 MMOL/L (ref 10–20)
AST SERPL W P-5'-P-CCNC: 14 U/L (ref 9–39)
BASOPHILS # BLD AUTO: 0.04 X10*3/UL (ref 0–0.1)
BASOPHILS NFR BLD AUTO: 0.9 %
BILIRUB SERPL-MCNC: 0.4 MG/DL (ref 0–1.2)
BUN SERPL-MCNC: 10 MG/DL (ref 6–23)
C3 SERPL-MCNC: 136 MG/DL (ref 87–200)
C4 SERPL-MCNC: 27 MG/DL (ref 10–50)
CALCIUM SERPL-MCNC: 9.9 MG/DL (ref 8.6–10.6)
CHLORIDE SERPL-SCNC: 105 MMOL/L (ref 98–107)
CO2 SERPL-SCNC: 26 MMOL/L (ref 21–32)
CREAT SERPL-MCNC: 0.73 MG/DL (ref 0.5–1.05)
CRP SERPL-MCNC: 0.11 MG/DL
DSDNA AB SER-ACNC: 6 IU/ML
EGFRCR SERPLBLD CKD-EPI 2021: >90 ML/MIN/1.73M*2
EOSINOPHIL # BLD AUTO: 0.28 X10*3/UL (ref 0–0.7)
EOSINOPHIL NFR BLD AUTO: 6.3 %
ERYTHROCYTE [DISTWIDTH] IN BLOOD BY AUTOMATED COUNT: 12.2 % (ref 11.5–14.5)
ERYTHROCYTE [SEDIMENTATION RATE] IN BLOOD BY WESTERGREN METHOD: 2 MM/H (ref 0–20)
GLUCOSE SERPL-MCNC: 85 MG/DL (ref 74–99)
HCT VFR BLD AUTO: 43.2 % (ref 36–46)
HGB BLD-MCNC: 14.3 G/DL (ref 12–16)
IMM GRANULOCYTES # BLD AUTO: 0.02 X10*3/UL (ref 0–0.7)
IMM GRANULOCYTES NFR BLD AUTO: 0.4 % (ref 0–0.9)
LYMPHOCYTES # BLD AUTO: 1.48 X10*3/UL (ref 1.2–4.8)
LYMPHOCYTES NFR BLD AUTO: 33.1 %
MCH RBC QN AUTO: 29.5 PG (ref 26–34)
MCHC RBC AUTO-ENTMCNC: 33.1 G/DL (ref 32–36)
MCV RBC AUTO: 89 FL (ref 80–100)
MONOCYTES # BLD AUTO: 0.37 X10*3/UL (ref 0.1–1)
MONOCYTES NFR BLD AUTO: 8.3 %
NEUTROPHILS # BLD AUTO: 2.28 X10*3/UL (ref 1.2–7.7)
NEUTROPHILS NFR BLD AUTO: 51 %
NRBC BLD-RTO: 0 /100 WBCS (ref 0–0)
PLATELET # BLD AUTO: 243 X10*3/UL (ref 150–450)
POTASSIUM SERPL-SCNC: 4.5 MMOL/L (ref 3.5–5.3)
PROT SERPL-MCNC: 7.2 G/DL (ref 6.4–8.2)
RBC # BLD AUTO: 4.85 X10*6/UL (ref 4–5.2)
SODIUM SERPL-SCNC: 141 MMOL/L (ref 136–145)
WBC # BLD AUTO: 4.5 X10*3/UL (ref 4.4–11.3)

## 2025-01-24 PROCEDURE — 3008F BODY MASS INDEX DOCD: CPT | Performed by: INTERNAL MEDICINE

## 2025-01-24 PROCEDURE — 86160 COMPLEMENT ANTIGEN: CPT

## 2025-01-24 PROCEDURE — 99213 OFFICE O/P EST LOW 20 MIN: CPT | Performed by: INTERNAL MEDICINE

## 2025-01-24 PROCEDURE — 85652 RBC SED RATE AUTOMATED: CPT

## 2025-01-24 PROCEDURE — 86225 DNA ANTIBODY NATIVE: CPT

## 2025-01-24 PROCEDURE — 85025 COMPLETE CBC W/AUTO DIFF WBC: CPT

## 2025-01-24 PROCEDURE — 86140 C-REACTIVE PROTEIN: CPT

## 2025-01-24 PROCEDURE — 1036F TOBACCO NON-USER: CPT | Performed by: INTERNAL MEDICINE

## 2025-01-24 PROCEDURE — 80053 COMPREHEN METABOLIC PANEL: CPT

## 2025-01-24 NOTE — PROGRESS NOTES
Subjective   Patient ID: Luisa Ingram is a 44 y.o. female who presents for Follow-up.    HPI  45 yo female with myasthenia gravis  In 2008, she was diagnosed with SLE based on arthralgia, myagia, Raynaud, photosensitivity, positive CAROLANN and low titer positive dsDNA  HCQ was started, but she did not tolerate HCQ  In 2011, she was diagnosed with M gravis, she had a tymectomy and currently, she is suing eculizumab, Soliris.  Her M gravis is in remission now  Today, She reports stiffness in her hands, but no joint pain.  USG showed inflammation in her right wrist  She is having Raynaud for many years and it is getting worse.  She also reports dry eyes and dry mouth  She denies oral ulcer, hair loss, fever     Interval history:  She reports mild joint pain in her hand, but denies swelling and AM stiffness  Her tests showed pos CAROLANN 1/320  Weak dsDNA 11  Neg GALLO  NL C3, c4 and normal urine    01/25:  She reports mild shortness of breath and congestion like sensation in her anterior chest  Work-up showed normal chest x-ray and PFT  Denies any joint pain, skin rashes, oral ulcer  ROS  Joint pain in hands: negative   Joint swelling: negative  Morning stiffness and duration: negative   strength: normal  Oral ulcer: negative  Genital ulcer: negative  Raynaud phenomenon: negative  Chest pain/dyspnea: negative  Low back pain: negative  Visual problem: negative  Dry eyes/dry mouth: negative  Skin rash/scaling/psoriasis: negative       Objective     PEXAM  VS reviewed, WNL  General: Alert, no distress   HEENT: Normocephalic/atraumatic, No alopecia. PERRLA. Sclera white, conjunctiva pink, no malar rash. no oral or nasal ulcer. Oral cavity pink and moist, no erythema or exudate, dentition good.   Neck: supple  Respiratory: CTA B, no adventitious breath sounds  Cardiac: RRR, no murmurs, carotid, or bruits  Abdominal: symmetrical, soft, non-tender, non-distended, normoactive BSx4 quadrants, no CVA tenderness or suprapubic  tenderness  MSK: Joints of upper and lower extremities were assessed for synovitis and ROM.    Today she has no evidence of synovitis in the joints of her hands or wrists, tender joint count 0, swollen joint count 0   Extremities: no clubbing, no cyanosis, no edema  Skin: Skin warm and moist.   Neuro: non-focal, Strength 5/5 throughout. Normal gait. No cerebellar pathologic exam     Assessment/Plan   43 yo female with Myastenia gravis and remote h/o SLE  She is using eculizumab for MG which is remission now.  SLE dx was based on arthralgia, myalgia, photosensitivity, Raynaud, pos CAROLANN, did not tolerate HCQ in 2008.  Today, she reports shortness of breath, denies any severe joint pain  Recent USG showed some inflammation in her right wirst.  PExam showed mild tenderness in her right wrist and minor capillary changes in her nailfolds, but no sclerodactyli, tightness in her face. No synovitis  Her CAROLANN pos (1/320), dsDNA weak pos 11, GALLO neg  I think she may have mild undifferentiated connective tissue disease or M. gravis related weak positive CAROLANN.  Her mother had DVT/PE and tests showed pos LA, but the patient has no h/o pregnancy complications or DVT.  -will see her tests, dsDNA, C3, C4  -will see her in 12 months

## 2025-02-05 ENCOUNTER — HOSPITAL ENCOUNTER (OUTPATIENT)
Dept: RADIOLOGY | Facility: HOSPITAL | Age: 45
Discharge: HOME | End: 2025-02-05
Payer: COMMERCIAL

## 2025-02-05 DIAGNOSIS — G70.00 MYASTHENIA GRAVIS ASSOCIATED WITH THYMOMA (MULTI): ICD-10-CM

## 2025-02-05 DIAGNOSIS — R06.09 DYSPNEA ON EXERTION: ICD-10-CM

## 2025-02-05 DIAGNOSIS — D49.89 MYASTHENIA GRAVIS ASSOCIATED WITH THYMOMA (MULTI): ICD-10-CM

## 2025-02-05 DIAGNOSIS — R06.02 SHORTNESS OF BREATH: ICD-10-CM

## 2025-02-05 PROCEDURE — 2550000001 HC RX 255 CONTRASTS: Performed by: FAMILY MEDICINE

## 2025-02-05 PROCEDURE — 71260 CT THORAX DX C+: CPT

## 2025-02-05 RX ADMIN — IOHEXOL 75 ML: 350 INJECTION, SOLUTION INTRAVENOUS at 07:29

## 2025-02-10 ENCOUNTER — TELEPHONE (OUTPATIENT)
Dept: PRIMARY CARE | Facility: CLINIC | Age: 45
End: 2025-02-10
Payer: COMMERCIAL

## 2025-02-10 NOTE — TELEPHONE ENCOUNTER
----- Message from Josie Simmons sent at 2/9/2025  2:48 AM EST -----  Can you schedule an appointment so we can discuss where to go next.

## 2025-02-20 ENCOUNTER — OFFICE VISIT (OUTPATIENT)
Dept: PULMONOLOGY | Facility: HOSPITAL | Age: 45
End: 2025-02-20
Payer: COMMERCIAL

## 2025-02-20 VITALS
SYSTOLIC BLOOD PRESSURE: 119 MMHG | HEIGHT: 65 IN | OXYGEN SATURATION: 100 % | RESPIRATION RATE: 16 BRPM | HEART RATE: 87 BPM | WEIGHT: 151 LBS | BODY MASS INDEX: 25.16 KG/M2 | DIASTOLIC BLOOD PRESSURE: 79 MMHG

## 2025-02-20 DIAGNOSIS — M32.9 SYSTEMIC LUPUS ERYTHEMATOSUS, UNSPECIFIED SLE TYPE, UNSPECIFIED ORGAN INVOLVEMENT STATUS (MULTI): ICD-10-CM

## 2025-02-20 DIAGNOSIS — J30.9 ALLERGIC RHINITIS, UNSPECIFIED SEASONALITY, UNSPECIFIED TRIGGER: ICD-10-CM

## 2025-02-20 DIAGNOSIS — R06.02 SHORTNESS OF BREATH: Primary | ICD-10-CM

## 2025-02-20 DIAGNOSIS — D49.89 MYASTHENIA GRAVIS ASSOCIATED WITH THYMOMA (MULTI): ICD-10-CM

## 2025-02-20 DIAGNOSIS — R06.09 DYSPNEA ON EXERTION: ICD-10-CM

## 2025-02-20 DIAGNOSIS — G70.00 MYASTHENIA GRAVIS ASSOCIATED WITH THYMOMA (MULTI): ICD-10-CM

## 2025-02-20 PROCEDURE — 3008F BODY MASS INDEX DOCD: CPT | Performed by: NURSE PRACTITIONER

## 2025-02-20 PROCEDURE — 1036F TOBACCO NON-USER: CPT | Performed by: NURSE PRACTITIONER

## 2025-02-20 PROCEDURE — 99214 OFFICE O/P EST MOD 30 MIN: CPT | Performed by: NURSE PRACTITIONER

## 2025-02-20 PROCEDURE — 99204 OFFICE O/P NEW MOD 45 MIN: CPT | Performed by: NURSE PRACTITIONER

## 2025-02-20 RX ORDER — ALBUTEROL SULFATE 90 UG/1
2 INHALANT RESPIRATORY (INHALATION) EVERY 4 HOURS PRN
Qty: 18 G | Refills: 11 | Status: SHIPPED | OUTPATIENT
Start: 2025-02-20

## 2025-02-20 ASSESSMENT — ENCOUNTER SYMPTOMS
UNEXPECTED WEIGHT CHANGE: 0
SHORTNESS OF BREATH: 1
FATIGUE: 0
FEVER: 0
WHEEZING: 0
COUGH: 0
RHINORRHEA: 1
CHILLS: 0
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
## 2025-02-20 NOTE — PATIENT INSTRUCTIONS
Start on albuterol as needed for shortness of breath, you can use this up to every 4 hours as needed.  Please get other lung test done.  I ordered an allergy panel blood test for you.   Call with any questions or concerns.  Follow up with Dr. Fung in 2 months. Call me sooner if needed.

## 2025-02-21 ENCOUNTER — APPOINTMENT (OUTPATIENT)
Dept: PRIMARY CARE | Facility: CLINIC | Age: 45
End: 2025-02-21
Payer: COMMERCIAL

## 2025-02-21 VITALS
DIASTOLIC BLOOD PRESSURE: 78 MMHG | OXYGEN SATURATION: 98 % | BODY MASS INDEX: 24.63 KG/M2 | WEIGHT: 148 LBS | TEMPERATURE: 98 F | SYSTOLIC BLOOD PRESSURE: 116 MMHG | HEART RATE: 74 BPM

## 2025-02-21 DIAGNOSIS — E32.9 THYMUS DISORDER (MULTI): ICD-10-CM

## 2025-02-21 DIAGNOSIS — R06.02 SHORTNESS OF BREATH: ICD-10-CM

## 2025-02-21 DIAGNOSIS — G70.00 MYASTHENIA GRAVIS ASSOCIATED WITH THYMOMA (MULTI): Primary | ICD-10-CM

## 2025-02-21 DIAGNOSIS — D49.89 MYASTHENIA GRAVIS ASSOCIATED WITH THYMOMA (MULTI): Primary | ICD-10-CM

## 2025-02-21 DIAGNOSIS — M32.9 SYSTEMIC LUPUS ERYTHEMATOSUS, UNSPECIFIED SLE TYPE, UNSPECIFIED ORGAN INVOLVEMENT STATUS (MULTI): ICD-10-CM

## 2025-02-21 PROBLEM — R14.0 ABDOMINAL DISTENSION: Status: RESOLVED | Noted: 2023-03-15 | Resolved: 2025-02-21

## 2025-02-21 PROBLEM — M76.71 PERONEAL TENDINITIS OF RIGHT LOWER EXTREMITY: Status: RESOLVED | Noted: 2023-03-15 | Resolved: 2025-02-21

## 2025-02-21 PROBLEM — N64.4 PAIN OF LEFT BREAST: Status: RESOLVED | Noted: 2023-03-15 | Resolved: 2025-02-21

## 2025-02-21 PROBLEM — G47.30 SLEEP APNEA: Status: RESOLVED | Noted: 2023-03-15 | Resolved: 2025-02-21

## 2025-02-21 PROBLEM — Z04.9 CONDITION NOT FOUND: Status: RESOLVED | Noted: 2023-03-15 | Resolved: 2025-02-21

## 2025-02-21 PROBLEM — R55 PRE-SYNCOPE: Status: RESOLVED | Noted: 2024-06-27 | Resolved: 2025-02-21

## 2025-02-21 PROBLEM — R63.5 UNEXPLAINED WEIGHT GAIN: Status: RESOLVED | Noted: 2023-03-15 | Resolved: 2025-02-21

## 2025-02-21 PROBLEM — S06.0XAA CONCUSSION: Status: RESOLVED | Noted: 2023-03-15 | Resolved: 2025-02-21

## 2025-02-21 PROBLEM — R51.9 HEADACHE, CHRONIC DAILY: Status: RESOLVED | Noted: 2023-03-15 | Resolved: 2025-02-21

## 2025-02-21 PROBLEM — R42 LIGHTHEADEDNESS: Status: RESOLVED | Noted: 2023-03-15 | Resolved: 2025-02-21

## 2025-02-21 PROBLEM — R19.7 DIARRHEA: Status: RESOLVED | Noted: 2023-03-15 | Resolved: 2025-02-21
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
## 2025-02-21 PROBLEM — R92.8 ABNORMAL MAMMOGRAM: Status: RESOLVED | Noted: 2023-03-15 | Resolved: 2025-02-21

## 2025-02-21 PROBLEM — R10.32 LEFT INGUINAL PAIN: Status: RESOLVED | Noted: 2023-03-15 | Resolved: 2025-02-21

## 2025-02-21 PROBLEM — Z86.2 HISTORY OF ANEMIA: Status: RESOLVED | Noted: 2024-06-27 | Resolved: 2025-02-21

## 2025-02-21 PROBLEM — H92.02 OTALGIA, LEFT: Status: RESOLVED | Noted: 2023-10-12 | Resolved: 2025-02-21

## 2025-02-21 PROBLEM — N63.20 LEFT BREAST LUMP: Status: RESOLVED | Noted: 2023-03-15 | Resolved: 2025-02-21

## 2025-02-21 PROBLEM — R14.0 ABDOMINAL BLOATING: Status: RESOLVED | Noted: 2023-03-15 | Resolved: 2025-02-21

## 2025-02-21 PROBLEM — Z01.419 ENCOUNTER FOR WELL WOMAN EXAM WITH ROUTINE GYNECOLOGICAL EXAM: Status: RESOLVED | Noted: 2024-10-15 | Resolved: 2025-02-21

## 2025-02-21 PROBLEM — M25.579 ANKLE JOINT PAIN: Status: RESOLVED | Noted: 2023-03-15 | Resolved: 2025-02-21

## 2025-02-21 PROBLEM — I10 EPISODE OF HYPERTENSION: Status: RESOLVED | Noted: 2023-03-15 | Resolved: 2025-02-21

## 2025-02-21 PROBLEM — R19.8 INCREASED ABDOMINAL GIRTH: Status: RESOLVED | Noted: 2023-03-15 | Resolved: 2025-02-21

## 2025-02-21 PROBLEM — Z98.890 HISTORY OF VASCULAR ACCESS DEVICE: Status: RESOLVED | Noted: 2023-03-15 | Resolved: 2025-02-21

## 2025-02-21 PROBLEM — R60.0 LEG EDEMA, LEFT: Status: RESOLVED | Noted: 2023-03-15 | Resolved: 2025-02-21

## 2025-02-21 PROBLEM — K52.9 GASTROENTERITIS: Status: RESOLVED | Noted: 2023-03-15 | Resolved: 2025-02-21

## 2025-02-21 PROBLEM — R10.30 ABDOMINAL PAIN, LOWER: Status: RESOLVED | Noted: 2023-03-15 | Resolved: 2025-02-21

## 2025-02-21 PROBLEM — R53.83 FATIGUE: Status: RESOLVED | Noted: 2023-03-15 | Resolved: 2025-02-21

## 2025-02-21 PROBLEM — L08.9 NONVENOMOUS INSECT BITE WITH INFECTION: Status: RESOLVED | Noted: 2024-06-27 | Resolved: 2025-02-21

## 2025-02-21 PROBLEM — L70.9 ADULT ACNE: Status: RESOLVED | Noted: 2023-03-15 | Resolved: 2025-02-21

## 2025-02-21 PROBLEM — M77.8 TENDINITIS OF FLEXOR TENDON OF RIGHT HAND: Status: RESOLVED | Noted: 2023-03-15 | Resolved: 2025-02-21

## 2025-02-21 PROBLEM — H93.8X3 SENSATION OF FULLNESS IN BOTH EARS: Status: RESOLVED | Noted: 2023-03-15 | Resolved: 2025-02-21

## 2025-02-21 PROBLEM — K52.9 COLITIS: Status: RESOLVED | Noted: 2023-03-15 | Resolved: 2025-02-21

## 2025-02-21 PROBLEM — R55 SYNCOPE AND COLLAPSE: Status: RESOLVED | Noted: 2023-03-15 | Resolved: 2025-02-21

## 2025-02-21 PROBLEM — B35.0 TINEA CAPITIS: Status: RESOLVED | Noted: 2023-03-15 | Resolved: 2025-02-21

## 2025-02-21 PROBLEM — E88.89 OTHER SPECIFIED METABOLIC DISORDERS: Status: RESOLVED | Noted: 2024-10-17 | Resolved: 2025-02-21

## 2025-02-21 LAB
A ALTERNATA IGE QN: <0.1 KU/L
A ALTERNATA IGE RAST: 0
A FUMIGATUS IGE QN: <0.1 KU/L
A FUMIGATUS IGE RAST: 0
BERMUDA GRASS IGE QN: <0.1 KU/L
BERMUDA GRASS IGE RAST: 0
BOXELDER IGE QN: <0.1 KU/L
BOXELDER IGE RAST: 0
C HERBARUM IGE QN: <0.1 KU/L
C HERBARUM IGE RAST: 0
CALIF WALNUT POLN IGE QN: <0.1 KU/L
CALIF WALNUT POLN IGE RAST: 0
CAT DANDER IGE QN: <0.1 KU/L
CAT DANDER IGE RAST: 0
CMN PIGWEED IGE QN: <0.1 KU/L
CMN PIGWEED IGE RAST: 0
COMMON RAGWEED IGE QN: <0.1 KU/L
COMMON RAGWEED IGE RAST: 0
COTTONWOOD IGE QN: <0.1 KU/L
COTTONWOOD IGE RAST: 0
D FARINAE IGE QN: <0.1 KU/L
D FARINAE IGE RAST: 0
D PTERONYSS IGE QN: <0.1 KU/L
D PTERONYSS IGE RAST: 0
DOG DANDER IGE QN: <0.1 KU/L
DOG DANDER IGE RAST: 0
IGE SERPL-ACNC: <2 KU/L
LONDON PLANE IGE QN: <0.1 KU/L
LONDON PLANE IGE RAST: 0
MOUSE URINE PROT IGE QN: <0.1 KU/L
MOUSE URINE PROT IGE RAST: 0
MT JUNIPER IGE QN: <0.1 KU/L
MT JUNIPER IGE RAST: 0
P NOTATUM IGE QN: <0.1 KU/L
P NOTATUM IGE RAST: 0
PECAN/HICK TREE IGE QN: <0.1 KU/L
PECAN/HICK TREE IGE RAST: 0
REF LAB TEST REF RANGE: NORMAL
ROACH IGE QN: <0.1 KU/L
ROACH IGE RAST: 0
SALTWORT IGE QN: <0.1 KU/L
SALTWORT IGE RAST: 0
SHEEP SORREL IGE QN: <0.1 KU/L
SHEEP SORREL IGE RAST: 0
SILVER BIRCH IGE QN: <0.1 KU/L
SILVER BIRCH IGE RAST: 0
TIMOTHY IGE QN: <0.1 KU/L
TIMOTHY IGE RAST: 0
WHITE ASH IGE QN: <0.1 KU/L
WHITE ASH IGE RAST: 0
WHITE ELM IGE QN: <0.1 KU/L
WHITE ELM IGE RAST: 0
WHITE MULBERRY IGE QN: <0.1 KU/L
WHITE MULBERRY IGE RAST: 0
WHITE OAK IGE QN: <0.1 KU/L
WHITE OAK IGE RAST: 0

## 2025-02-21 PROCEDURE — 1036F TOBACCO NON-USER: CPT | Performed by: FAMILY MEDICINE

## 2025-02-21 PROCEDURE — G2211 COMPLEX E/M VISIT ADD ON: HCPCS | Performed by: FAMILY MEDICINE

## 2025-02-21 PROCEDURE — 99214 OFFICE O/P EST MOD 30 MIN: CPT | Performed by: FAMILY MEDICINE

## 2025-02-21 ASSESSMENT — PATIENT HEALTH QUESTIONNAIRE - PHQ9
SUM OF ALL RESPONSES TO PHQ9 QUESTIONS 1 AND 2: 0
2. FEELING DOWN, DEPRESSED OR HOPELESS: NOT AT ALL
1. LITTLE INTEREST OR PLEASURE IN DOING THINGS: NOT AT ALL
SUM OF ALL RESPONSES TO PHQ9 QUESTIONS 1 AND 2: 0
1. LITTLE INTEREST OR PLEASURE IN DOING THINGS: NOT AT ALL
2. FEELING DOWN, DEPRESSED OR HOPELESS: NOT AT ALL

## 2025-02-21 ASSESSMENT — ENCOUNTER SYMPTOMS
OCCASIONAL FEELINGS OF UNSTEADINESS: 0
LOSS OF SENSATION IN FEET: 0
DEPRESSION: 0

## 2025-02-21 NOTE — PROGRESS NOTES
Assessment/Plan     She has upcoming visits with Neuromuscular medicine, pulmonology and allergy in March/April for additional tests to work up continued shortness of rbeath.   Considering changing medications w/ dr. Ackerman if indicated on his eval in April.   We discussed conditioning exercises   She is to increase her intensity on treadmill every week by 0.2 speed for 10 minutes 2x / week   After 3 months will increasetime on treadmill to 15 mintues anddecreasing to original pace.   Will continue to do this to increase cardiovascular strength.     Problem List Items Addressed This Visit       Myasthenia gravis associated with thymoma (Multi) - Primary    Systemic lupus erythematosus (Multi)    Thymus disorder (Multi)     Other Visit Diagnoses       Shortness of breath                      Subjective   Patient ID: Luisa Ingram is a 44 y.o. female who presents for Follow Up Pulmonary Issues.    Pts daughter is graduating high school in may; getting national honor society cords in April.     She is still experiencing shortness of breath saw pulmonologist yesterday   Will get allergy tested   Has upcoming test to test diaphragm strength   Sees Dr. Ackerman in April     Objective   /78 (BP Location: Left arm, Patient Position: Sitting, BP Cuff Size: Adult)   Pulse 74   Temp 36.7 °C (98 °F) (Skin)   Wt 67.1 kg (148 lb)   SpO2 98%   BMI 24.63 kg/m²     Physical Exam:     Constitutional:   General: not in acute distress  Appearance: normal appearance, non-toxic, not ill-appearing or diaphoretic.   HENT:   Head: Normocephalic and atraumatic  Eyes: EOMI, PERRL        Josie Simmons,      I spent a total of 23 minutes on the date of the service which included preparing to see the patient, face-to-face patient care, completing clinical documentation, performing a medically appropriate examination, counseling and educating the patient/family/caregiver and ordering medications, tests, or procedures.

## 2025-03-07 ENCOUNTER — HOSPITAL ENCOUNTER (OUTPATIENT)
Dept: RESPIRATORY THERAPY | Facility: HOSPITAL | Age: 45
Discharge: HOME | End: 2025-03-07
Payer: COMMERCIAL

## 2025-03-07 DIAGNOSIS — R06.02 SHORTNESS OF BREATH: ICD-10-CM

## 2025-03-07 PROCEDURE — 94799 UNLISTED PULMONARY SVC/PX: CPT

## 2025-03-10 ENCOUNTER — APPOINTMENT (OUTPATIENT)
Dept: NEUROLOGY | Facility: CLINIC | Age: 45
End: 2025-03-10
Payer: COMMERCIAL

## 2025-03-10 VITALS
HEIGHT: 65 IN | SYSTOLIC BLOOD PRESSURE: 108 MMHG | BODY MASS INDEX: 24.66 KG/M2 | RESPIRATION RATE: 18 BRPM | WEIGHT: 148 LBS | HEART RATE: 83 BPM | DIASTOLIC BLOOD PRESSURE: 77 MMHG

## 2025-03-10 DIAGNOSIS — D49.89 MYASTHENIA GRAVIS ASSOCIATED WITH THYMOMA (MULTI): Primary | ICD-10-CM

## 2025-03-10 DIAGNOSIS — G70.00 MYASTHENIA GRAVIS ASSOCIATED WITH THYMOMA (MULTI): Primary | ICD-10-CM

## 2025-03-10 PROCEDURE — 99214 OFFICE O/P EST MOD 30 MIN: CPT | Performed by: PSYCHIATRY & NEUROLOGY

## 2025-03-10 PROCEDURE — 3008F BODY MASS INDEX DOCD: CPT | Performed by: PSYCHIATRY & NEUROLOGY

## 2025-03-10 ASSESSMENT — PAIN SCALES - GENERAL: PAINLEVEL_OUTOF10: 2

## 2025-03-10 NOTE — PROGRESS NOTES
Date of Service: 3/10/2025  Patient: Luisa Ingram  MRN: [id number]    Diagnosis:     Seropositive generalized myasthenia gravis associated with thymoma    Impression:     Luisa Ingram is a 44 y.o. with generalized seropositive myasthenia gravis and a history of thymoma status post thymectomy in 2002. She has also SLE.     She is now on eculizumab (Soliris) which was started in the spring of 2018 . She has tolerated it very well and has had minimal manifestations since. Her IVIG was ultimately discontinued in November 2019 and prednisone was discontinued in July 2019. Prior to this, she had been resistant to therapy and had not tolerated immunosuppressive therapy because of side effects or neutropenia. She also has had difficulty taking larger dose of Mestinon because of severe diarrhea and not taking any Mestinon for now.      She denies any issues with her MEDport and tolerates this infusion without any noticeable side effects. Her last CT of chest was in 2017.    She has been having persistent shortness of breath with activity, but no orthopnea. She is currently seeing pulmonology and was started on albuterol and had and allergy workup. She had a normal pulmonary function test and has a pending Respiratory Muscle Force test.     Plan:   -Shortness of breath likely not related to Myasthenia, continue to follow with pulmonology for workup and monitor for any worsening symptoms  -Continue Soliris (eculizumab) at home with maintenance every 2 weeks via Mediport.   -Follow-up in 4 months. Call with any questions.     SHABNAM Lobo-CNP    ATTENDING NOTE - JONATAN RATLIFF M.D.    I saw patient with the nurse practitioner and agree with the edits, history and exam that I helped formulate per above.      Jonatan Ratliff M.D., F.A.C.P.   Director, [facility] & [facility] laboratory   [facility]   [facility]   Professor of Neurology   [facility]  "[facility] [employer]    The total appointment time today was 30 minutes. Time included preparing to see the patient, obtaining the history, performing a medically necessary appropriate physical examination, counseling and educating the patient/family, and documenting clinical information in the medical record.      History of Present Illness:    Ms. Ingram is a 44 y.o. female with year old woman with myasthenia gravis AChR antibody positive who is here for her scheduled follow-up. She was last seen 11/04/2024 for an exacerbation of myasthenia symptoms. She arrives with her mother.     Interval history: She was doing well until December when she went to the ED with shortness of breath. She describes it as if \"her air was leaking\" at the top of her chest. She has been getting more short of breath and fatigued. She got a pulmonary function test in December that was normal. She did an allergy tests. This past Friday, she did a Respiratory Muscle Force test with Pulmonology and results are still pending. She was started her on albuterol and that has not been helping. In the past she states that there were times in the winter where she would sometimes use her dad's oxygen for 10 minutes or so when it felt like she couldn't catch her breath. Since December, however this symptoms has been more persistent. She denies any orthopnea.     Has intermittent joint pain with her lupus. She is getting the Soliris every 2 weeks, next infusion is due Friday. Her MEDport is working well. She has not taken Mestinon since 2018 due to significant GI symptoms.     To Re-cap: She was s/p thymectomy due to thymoma in 2009 and diagnosed with seropositive myasthenia gravis in 2013. She had been resistant to therapy and had not tolerated immunosuppressive therapy because of side effects or neutropenia. She began receiving eculizumab (Soliris) since Spring of 2018 which she receives through her MEDport as a home infusion. Her " "IVIG was discontinued and received her last dose was in in November 2019, Prednisone was discontinued in July 2019, and has not taken Mestinon due to her abdominal discomfort and uncontrollable diarrhea. She denies any issues with her MEDport and tolerates this infusion without any noticeable side effects. Her last CT of chest was in 2017. She completed her meningitis vaccination required every 5 years in 2023 including both Menactra and Bexsero.     Otherwise, the past medical history, social history, and review of systems were reviewed. There are no significant changes.    Neuromuscular Exam:    /77   Pulse 83   Resp 18   Ht 1.651 m (5' 5\")   Wt 67.1 kg (148 lb)   BMI 24.63 kg/m²      The patient is in no apparent distress. The patient could count to 33 with a single breath. Eyelids are normal with no ptosis despite 1 minute of sustained upgaze. Extraocular muscles are full with no subjective double vision. Eye closure strength is normal. Mouth closure strength is normal. Neck flexor and extensor strengths are normal. There is no dysarthria.   R L   Shoulder abduction  5 5  Elbow flexion   5 5  Elbow extension  5 5  Finger flexion   5 5  Finger extension  5 5  Finger abduction  4+ 4+  limited due to joint pain  Thumb abduction   5 5  Hip flexion   5 5  Hip extension   5 5  Knee flexion   5 5  Knee extension  5 5  Ankle dorsiflexion  5 5  Ankle plantarflexion  5 5    Reflexes:     R          L  BR:               2          2  Biceps:         2          2  Triceps:        2          2  Knee:           2          2  Ankle:          2          2    - Absolute contraindications (are life-threatening)   Curare    D-penicillamine   Botulinum toxin- Botox   Interferon alpha  - Contraindications (should be avoided)   Antibiotics-  o Aminoglycosides- Gentamycin, Kanamycin, Amikacin, Neomycin, Streptomycin,                                                Tobramycin, Netilmycin, Paromomycin, spectinomycin,         "                                              Vancomycin  o Macrolides-           Azithromycin (Z-pack), Erythromycin, Clarithromycin                                       (Biaxin), Telithromycin      o Fluoroquinolones   Ciprofloxacin (Cipro), Norfloxacin, Levofloxacin  o Tetracyclines  Tetracycline, Doxycycline, Minocylcine     Anti-malarials-       Chloroquine, hydroxychloroquine   Anti-Fungals-         Voriconazole   Anti-arrhythmics-  Quinidine, Procainamide, Etafenone, Peruvoside   Magnesium-           Oral tablets, IV magnesium replacement.    - Use with Caution- may exacerbate weakness in some myasthenics   Antihypertensives  o Calcium channel blockers- Verapamil, Nifedipine, Felodipine   o Beta blockers- Propanalol, Atenolol, Acebutolol, Practolol, Oxprenolol, Sotalol,   Nadolol, and Ophthalmic Timolol   Lithium

## 2025-03-14 ENCOUNTER — TELEPHONE (OUTPATIENT)
Dept: PULMONOLOGY | Facility: HOSPITAL | Age: 45
End: 2025-03-14
Payer: COMMERCIAL

## 2025-03-14 NOTE — TELEPHONE ENCOUNTER
Patient acknowledged understanding. All questions answered at this time.     ----- Message from Su Jara sent at 3/14/2025 10:19 AM EDT -----  Please call the patient and let her know that MIP/MEP breathing test she did was normal, keep follow up as scheduled.

## 2025-03-26 ENCOUNTER — OFFICE VISIT (OUTPATIENT)
Dept: PRIMARY CARE | Facility: CLINIC | Age: 45
End: 2025-03-26
Payer: COMMERCIAL

## 2025-03-26 VITALS
HEART RATE: 80 BPM | TEMPERATURE: 98 F | BODY MASS INDEX: 24.99 KG/M2 | OXYGEN SATURATION: 99 % | DIASTOLIC BLOOD PRESSURE: 79 MMHG | WEIGHT: 150.2 LBS | SYSTOLIC BLOOD PRESSURE: 113 MMHG

## 2025-03-26 DIAGNOSIS — J01.10 ACUTE NON-RECURRENT FRONTAL SINUSITIS: Primary | ICD-10-CM

## 2025-03-26 PROCEDURE — 99213 OFFICE O/P EST LOW 20 MIN: CPT | Performed by: STUDENT IN AN ORGANIZED HEALTH CARE EDUCATION/TRAINING PROGRAM

## 2025-03-26 PROCEDURE — 1036F TOBACCO NON-USER: CPT | Performed by: STUDENT IN AN ORGANIZED HEALTH CARE EDUCATION/TRAINING PROGRAM

## 2025-03-26 RX ORDER — AZITHROMYCIN 250 MG/1
TABLET, FILM COATED ORAL
Qty: 6 TABLET | Refills: 0 | Status: SHIPPED | OUTPATIENT
Start: 2025-03-26 | End: 2025-03-31

## 2025-03-26 ASSESSMENT — ENCOUNTER SYMPTOMS
SINUS PRESSURE: 1
SHORTNESS OF BREATH: 1
FEVER: 0
DIZZINESS: 0
CHILLS: 0
SINUS PAIN: 1
COUGH: 1
HEADACHES: 0
LIGHT-HEADEDNESS: 0

## 2025-03-26 NOTE — PROGRESS NOTES
Assessment/Plan   Assessment & Plan  Acute non-recurrent frontal sinusitis    Orders:    azithromycin (Zithromax) 250 mg tablet; Take 2 tablets (500 mg) by mouth once daily for 1 day, THEN 1 tablet (250 mg) once daily for 4 days. Take 2 tabs (500 mg) by mouth today, than 1 daily for 4 days..        Subjective   Patient ID: Luisa Ingram is a 44 y.o. female who presents for Sinusitis (For a week and a half, sinus congestion, cough because of drainage, producing yellow mucus.).  Sinusitis  Associated symptoms include congestion, coughing, shortness of breath (chronic, unchanged) and sinus pressure. Pertinent negatives include no chills or headaches.     Patient has had sinus pain, pressure, cough, post nasal drip and congestion for one week.     Review of Systems   Constitutional:  Negative for chills and fever.   HENT:  Positive for congestion, postnasal drip, sinus pressure and sinus pain.    Respiratory:  Positive for cough and shortness of breath (chronic, unchanged).    Neurological:  Negative for dizziness, light-headedness and headaches.       Objective   Physical Exam  Constitutional:       General: She is not in acute distress.     Appearance: Normal appearance. She is not ill-appearing.   HENT:      Head: Normocephalic and atraumatic.      Mouth/Throat:      Pharynx: No oropharyngeal exudate or posterior oropharyngeal erythema.   Eyes:      Extraocular Movements: Extraocular movements intact.   Cardiovascular:      Rate and Rhythm: Normal rate and regular rhythm.   Pulmonary:      Effort: Pulmonary effort is normal. No respiratory distress.      Breath sounds: Normal breath sounds. No stridor. No wheezing, rhonchi or rales.   Neurological:      Mental Status: She is alert.             Yan Emmanuel MD 03/26/25 10:45 AM

## 2025-04-03 ENCOUNTER — APPOINTMENT (OUTPATIENT)
Dept: ALLERGY | Facility: CLINIC | Age: 45
End: 2025-04-03
Payer: COMMERCIAL

## 2025-04-03 VITALS
BODY MASS INDEX: 25.26 KG/M2 | SYSTOLIC BLOOD PRESSURE: 131 MMHG | OXYGEN SATURATION: 97 % | HEART RATE: 82 BPM | HEIGHT: 65 IN | DIASTOLIC BLOOD PRESSURE: 84 MMHG | WEIGHT: 151.6 LBS

## 2025-04-03 DIAGNOSIS — Z88.0 PENICILLIN ALLERGY: Primary | ICD-10-CM

## 2025-04-03 DIAGNOSIS — T78.1XXA ADVERSE FOOD REACTION, INITIAL ENCOUNTER: ICD-10-CM

## 2025-04-03 PROCEDURE — 95004 PERQ TESTS W/ALRGNC XTRCS: CPT | Performed by: ALLERGY & IMMUNOLOGY

## 2025-04-03 PROCEDURE — 99204 OFFICE O/P NEW MOD 45 MIN: CPT | Performed by: ALLERGY & IMMUNOLOGY

## 2025-04-03 ASSESSMENT — ENCOUNTER SYMPTOMS
EYES NEGATIVE: 1
GASTROINTESTINAL NEGATIVE: 1
ALLERGIC/IMMUNOLOGIC NEGATIVE: 1
MUSCULOSKELETAL NEGATIVE: 1
CARDIOVASCULAR NEGATIVE: 1
HEMATOLOGIC/LYMPHATIC NEGATIVE: 1
CONSTITUTIONAL NEGATIVE: 1
RESPIRATORY NEGATIVE: 1

## 2025-04-03 NOTE — PATIENT INSTRUCTIONS
It was nice to meet you today     Your allergy testing today was negative to peanut and tree nuts    We recommend penicillin skin testing which you may schedule at your convenience.  You will need to be off antihistamines for 7 days prior to testing, and we will need to send in the medication for you to bring with you to your appointment.  Please call us at [phone]. [phone] if you would like to schedule     Food Allergy Test Results  Controls  Positive Histamine: 7/30  Negative Saline: 0  Common Allergens  Peanut: 0  Tree Nuts  [place]: 0  Brazil Nut: 0  Cashew: 0  Hazelnut: 0  Pecan: 0  Pistachio: 0
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
## 2025-04-03 NOTE — LETTER
April 3, 2025     Josie Simmons DO  [address]  [address]  [facility] [address]    Patient: Luisa Ingram   YOB: 1980   Date of Visit: 4/3/2025       Dear Dr. Josie Simmons DO:    Thank you for referring Luisa Ingram to me for evaluation. Below are my notes for this consultation.  If you have questions, please do not hesitate to call me. I look forward to following your patient along with you.       Sincerely,     Princess GUME Del Toro MD      CC: No Recipients  ______________________________________________________________________________________    Luisa Ingram presents for initial evaluation today.      Luisa Ingram was seen at the request of Josie Simmons DO for a chief complaint of concern for nut allergy; a report with my findings is being sent via written or electronic means to Josie Simmons DO with my recommendations for treatment    She provides the following history:  She presents with her mother for today's visit.  She has a history of SLE and myasthenia gravis.  She is here for concern of nut allergy.  They note that she never ate nuts growing up because of parental preference.  She notes that in 2004 2005, she thinks that a colleague ate a peanut butter sandwich touched an item that she subsequently touched, and then she developed hives.  She has avoided peanuts and all tree nuts her entire life.  She tolerates egg, milk, wheat, soy, fish, shellfish, sesame with no problem.    Rhinitis: She recently had environmental allergen specific IgE panel which was negative    Asthma: Denies history of asthma, however is being evaluated by pulmonary for shortness of breath    Eczema: Denies but has had other rashes possibly related to autoimmune disease    Venom allergy:  Denies     Drug allergy: Penicillin, had wisdom teeth out and was prescribed PCN and had nausea and sweating in 1999 without any associated rash or swelling.  She reports rash with Plaquenil in 2011 after 2  "weeks being on the medication.  She did not have any associated swelling or shortness of breath.  She reports that she had hives on her face with sulfa antibiotic around age 8 or 9, recalls it happened immediately.      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    HENT: Negative.     Eyes: Negative.    Respiratory: Negative.     Cardiovascular: Negative.    Gastrointestinal: Negative.    Musculoskeletal: Negative.    Skin: Negative.    Allergic/Immunologic: Negative.    Hematological: Negative.      Vital signs:  /84   Pulse 82   Ht 1.651 m (5' 5\")   Wt 68.8 kg (151 lb 9.6 oz)   SpO2 97%   BMI 25.23 kg/m²     Physical Exam:  GENERAL: Alert, oriented and in no acute distress.     HEENT: EYES: No conjunctival injection or cobblestoning. Nose: nasal turbinates mildly edematous and are not boggy.  There is no mucous stranding, polyps, or blood noted. EARS: Tympanic membranes are clear. MOUTH: moist and pink with no exudates, ulcers, or thrush. NECK: No upper airway stridor noted.       HEART: regular rate and rhythm.       LUNGS: Clear to auscultation bilaterally. No wheezing, rhonchi or rales.        ABDOMEN: Positive bowel sounds, soft, nontender, nondistended.       EXTREMITIES: No clubbing or edema.        NEURO:  Normal affect.  Gait normal.      SKIN: No rash, hives, or angioedema noted    Environmental IgE testing    Lab Results   Component Value Date    WHITEASH <0.10 02/20/2025    SILVERBIRCH <0.10 02/20/2025    BOXELDER <0.10 02/20/2025    MOUNTJUNIPER <0.10 02/20/2025    COTTONWOOD <0.10 02/20/2025    ELM <0.10 02/20/2025    MULBERRY <0.10 02/20/2025    PECANHICKORY <0.10 02/20/2025    MAPLESYCAMOR <0.10 02/20/2025    OAK <0.10 02/20/2025    BERMUDAGR <0.10 02/20/2025    TIMOTHYGRASS <0.10 02/20/2025     Lab " Results   Component Value Date    PIGWEED <0.10 02/20/2025    COMRAGWEED <0.10 02/20/2025    RUSSIANT <0.10 02/20/2025    SHEEPSOR <0.10 02/20/2025    CATEPI <0.10 02/20/2025    DOGEPI <0.10 02/20/2025    ALTERNA <0.10 02/20/2025    CLADHERB <0.10 02/20/2025    ICA04 <0.10 02/20/2025    PENICILLIUM <0.10 02/20/2025    DERMFAR <0.10 02/20/2025    DERMPTE <0.10 02/20/2025    COCKR <0.10 02/20/2025       Food Allergy Test Results  Controls  Positive Histamine: 7/30  Negative Saline: 0  Common Allergens  Peanut: 0  Tree Nuts  [place]: 0  Brazil Nut: 0  Cashew: 0  Hazelnut: 0  Pecan: 0  Pistachio: 0       Impression:  1. Penicillin allergy    2. Adverse food reaction, initial encounter      Assessment and Plan:  Adverse reaction to food, initial encounter: Allergy testing today is negative to peanut and tree nut.  She does not require specific avoidance.  If there are any future issues, she was advised to let us know    History of penicillin allergy: Recommend penicillin skin testing to further evaluate and amoxicillin challenge if negative.  Information provided today to schedule for testing.

## 2025-04-18 ENCOUNTER — OFFICE VISIT (OUTPATIENT)
Dept: PULMONOLOGY | Facility: CLINIC | Age: 45
End: 2025-04-18
Payer: COMMERCIAL

## 2025-04-18 VITALS
SYSTOLIC BLOOD PRESSURE: 108 MMHG | OXYGEN SATURATION: 95 % | TEMPERATURE: 98.1 F | WEIGHT: 151 LBS | RESPIRATION RATE: 16 BRPM | HEART RATE: 88 BPM | DIASTOLIC BLOOD PRESSURE: 75 MMHG | HEIGHT: 65 IN | BODY MASS INDEX: 25.16 KG/M2

## 2025-04-18 DIAGNOSIS — R06.09 DYSPNEA ON EXERTION: Primary | ICD-10-CM

## 2025-04-18 PROCEDURE — 99214 OFFICE O/P EST MOD 30 MIN: CPT | Performed by: STUDENT IN AN ORGANIZED HEALTH CARE EDUCATION/TRAINING PROGRAM

## 2025-04-18 PROCEDURE — 3008F BODY MASS INDEX DOCD: CPT | Performed by: STUDENT IN AN ORGANIZED HEALTH CARE EDUCATION/TRAINING PROGRAM

## 2025-04-18 ASSESSMENT — ENCOUNTER SYMPTOMS
WHEEZING: 0
COUGH: 0
SLEEP DISTURBANCE: 0
LIGHT-HEADEDNESS: 0
ARTHRALGIAS: 1
MYALGIAS: 1
CHILLS: 0
UNEXPECTED WEIGHT CHANGE: 0
CHOKING: 0
JOINT SWELLING: 1
PALPITATIONS: 0
ABDOMINAL PAIN: 0
DIZZINESS: 0
FEVER: 0
SHORTNESS OF BREATH: 1
VOICE CHANGE: 0
TROUBLE SWALLOWING: 0
CHEST TIGHTNESS: 0

## 2025-04-18 NOTE — PROGRESS NOTES
Subjective   Patient ID: Luisa Ingram is a 45 y.o. female who presents for Shortness of Breath (Patient is here for follow up visit last seen with Su Jara CNP. Patient states her breathing is the same. Patient states she has can not get a deep breath. Patient is using rescue inhaler states she does not think it works. Patient states she has chickens. ).  HPI    SOB since December, was driving home from work and had acute onset SOB and felt that she had breath escaping through her chest. Gets sinus infections chronically. Exertional dyspnea with about ~1-2 exercise tolerate. Had sinus infections in January and march. Has unresponsive episodes when exposed to perfumes, no difficulty breathing with perfumes.     Soc/exposures: non smoker  FH: both children have asthma, father - CAD, PAD, AAA    Review of Systems   Constitutional:  Negative for chills, fever and unexpected weight change.   HENT:  Negative for congestion, postnasal drip, trouble swallowing and voice change.    Eyes:  Negative for visual disturbance.   Respiratory:  Positive for shortness of breath. Negative for cough, choking, chest tightness and wheezing.    Cardiovascular:  Negative for chest pain, palpitations and leg swelling.        +orthopnea   Gastrointestinal:  Negative for abdominal pain.   Musculoskeletal:  Positive for arthralgias (wrists and fingers), joint swelling (wrists and fingers) and myalgias.   Neurological:  Negative for dizziness, syncope and light-headedness.   Psychiatric/Behavioral:  Negative for sleep disturbance.        Objective   Physical Exam    Assessment/Plan   {Assess/PlanSmartLinks:64106}         Kourtney Fung DO 04/18/25 10:01 AM    lymphadenopathy.  The lungs are clear.  Upper abdomen: Within normal limits.  Osseous structures: Normal.    Labs:     Resp allergen panel negative 2/2025, IgE <2  No eosinophilia    Soc/exposures: non smoker, +secondhand smoke exposure as a child. Has chickens. Works in a school cafeteria. No silica, beryllium, or metal dust exposure. No amio or methotrexate,      FH: both children have asthma, father - CAD, PAD, AAA    Review of Systems   Constitutional:  Negative for chills, fever and unexpected weight change.   HENT:  Negative for congestion, postnasal drip, trouble swallowing and voice change.    Eyes:  Negative for visual disturbance.   Respiratory:  Positive for shortness of breath. Negative for cough, choking, chest tightness and wheezing.    Cardiovascular:  Negative for chest pain, palpitations and leg swelling.        +orthopnea   Gastrointestinal:  Negative for abdominal pain.   Musculoskeletal:  Positive for arthralgias (wrists and fingers), joint swelling (wrists and fingers) and myalgias.   Neurological:  Negative for dizziness, syncope and light-headedness.   Psychiatric/Behavioral:  Negative for sleep disturbance.        Objective   Physical Exam  Constitutional:       General: She is not in acute distress.     Appearance: Normal appearance. She is not toxic-appearing.   HENT:      Head: Normocephalic and atraumatic.      Nose: No rhinorrhea.      Mouth/Throat:      Mouth: Mucous membranes are moist.   Eyes:      General: No scleral icterus.     Extraocular Movements: Extraocular movements intact.      Conjunctiva/sclera: Conjunctivae normal.   Cardiovascular:      Rate and Rhythm: Normal rate and regular rhythm.      Heart sounds: No murmur heard.     No friction rub. No gallop.   Pulmonary:      Effort: Pulmonary effort is normal.      Breath sounds: Normal breath sounds. No wheezing, rhonchi or rales.   Abdominal:      General: There is no distension.   Musculoskeletal:      Right lower leg: No  edema.      Left lower leg: No edema.   Skin:     General: Skin is warm and dry.   Neurological:      Mental Status: She is alert and oriented to person, place, and time. Mental status is at baseline.   Psychiatric:         Mood and Affect: Mood normal.         Behavior: Behavior normal.         Thought Content: Thought content normal.         Judgment: Judgment normal.         Assessment/Plan     Ms. Ingram is a 45yoF never smoker with history of allergic rhinitis, SLE, myasthenia gravis d/t thymoma s/p thymectomy in 2002 on soliris, mediport placement presenting for pulmonary follow up on her dyspnea.    PFTs, MIP/MEP, imaging and allergy workup all negative. Will proceed with CPET to elucidate etiology of dyspnea and TTE to rule out heart disease as a cause (though low suspicion)    Diagnoses and all orders for this visit:  Dyspnea on exertion  -     Cardiopulmonary (Metabolic) Stress Test; Future  -     Transthoracic Echo (TTE) Complete; Future  Other orders  -     perflutren lipid microspheres (Definity) injection 0.5-10 mL of dilution  -     sulfur hexafluoride microsphr (Lumason) injection 24.28 mg  -     perflutren protein A microsphere (Optison) injection 0.5 mL  -     Insert and maintain peripheral IV; Standing         Kourtney Fung DO 04/18/25 10:01 AM

## 2025-04-18 NOTE — PATIENT INSTRUCTIONS
Please schedule the ultrasound of your heart at your earliest convenience  Please schedule your cardiopulmonary exercise stress test to help us figure out the reason for your shortness of breath    Follow up in 2-3 months

## 2025-04-22 ENCOUNTER — TELEPHONE (OUTPATIENT)
Dept: ALLERGY | Facility: HOSPITAL | Age: 45
End: 2025-04-22
Payer: COMMERCIAL

## 2025-05-01 ENCOUNTER — TELEPHONE (OUTPATIENT)
Dept: ALLERGY | Facility: HOSPITAL | Age: 45
End: 2025-05-01
Payer: COMMERCIAL

## 2025-05-01 DIAGNOSIS — Z88.0 PENICILLIN ALLERGY: ICD-10-CM

## 2025-05-01 RX ORDER — AMOXICILLIN 400 MG/5ML
POWDER, FOR SUSPENSION ORAL
Qty: 25 ML | Refills: 0 | Status: SHIPPED | OUTPATIENT
Start: 2025-05-01

## 2025-05-01 NOTE — TELEPHONE ENCOUNTER
Left VM with patient reminding to review challenge letter and return call if any questions or concerns.

## 2025-05-06 ENCOUNTER — TELEPHONE (OUTPATIENT)
Dept: ALLERGY | Facility: CLINIC | Age: 45
End: 2025-05-06
Payer: COMMERCIAL

## 2025-05-06 ENCOUNTER — APPOINTMENT (OUTPATIENT)
Dept: CARDIOLOGY | Facility: CLINIC | Age: 45
End: 2025-05-06
Payer: COMMERCIAL

## 2025-05-06 NOTE — TELEPHONE ENCOUNTER
"Called [employer] to attempt to PA challenge.  said it need 14 business days to turn around time. Unable to submit from [place] and from a pharmacy benefit. Starting a \"Work around\" PA to be submitted. Called patient to update her that it needs 14 business days to be submitted    Spoke with patient and sent clinicals to [facility] for PA review  "

## 2025-05-07 ENCOUNTER — HOSPITAL ENCOUNTER (OUTPATIENT)
Dept: CARDIOLOGY | Facility: CLINIC | Age: 45
Discharge: HOME | End: 2025-05-07
Payer: COMMERCIAL

## 2025-05-07 DIAGNOSIS — R06.09 DYSPNEA ON EXERTION: ICD-10-CM

## 2025-05-07 PROCEDURE — 93306 TTE W/DOPPLER COMPLETE: CPT | Performed by: INTERNAL MEDICINE

## 2025-05-07 PROCEDURE — 93306 TTE W/DOPPLER COMPLETE: CPT

## 2025-05-07 NOTE — TELEPHONE ENCOUNTER
Pa approved, called kandy to let her know. Left a vm with callback instructions. PA approval saved in media tab in epic

## 2025-05-08 ENCOUNTER — APPOINTMENT (OUTPATIENT)
Dept: ALLERGY | Facility: CLINIC | Age: 45
End: 2025-05-08
Payer: COMMERCIAL

## 2025-05-08 VITALS
HEART RATE: 76 BPM | OXYGEN SATURATION: 99 % | SYSTOLIC BLOOD PRESSURE: 126 MMHG | TEMPERATURE: 98.3 F | BODY MASS INDEX: 24.99 KG/M2 | RESPIRATION RATE: 16 BRPM | WEIGHT: 150 LBS | DIASTOLIC BLOOD PRESSURE: 88 MMHG | HEIGHT: 65 IN

## 2025-05-08 DIAGNOSIS — Z88.0 ALLERGY TO AMOXICILLIN: ICD-10-CM

## 2025-05-08 DIAGNOSIS — Z88.0 PENICILLIN ALLERGY: Primary | ICD-10-CM

## 2025-05-08 LAB
AORTIC VALVE PEAK VELOCITY: 0.99 M/S
AV PEAK GRADIENT: 4 MMHG
AVA (PEAK VEL): 3.36 CM2
EJECTION FRACTION APICAL 4 CHAMBER: 64.3
EJECTION FRACTION: 62 %
LEFT ATRIUM VOLUME AREA LENGTH INDEX BSA: 20.4 ML/M2
LEFT VENTRICLE INTERNAL DIMENSION DIASTOLE: 4.12 CM (ref 3.5–6)
LEFT VENTRICULAR OUTFLOW TRACT DIAMETER: 2.13 CM
MITRAL VALVE E/A RATIO: 0.82
RIGHT VENTRICLE FREE WALL PEAK S': 11.21 CM/S
RIGHT VENTRICLE PEAK SYSTOLIC PRESSURE: 22.6 MMHG
TRICUSPID ANNULAR PLANE SYSTOLIC EXCURSION: 2.4 CM

## 2025-05-08 PROCEDURE — 95076 INGEST CHALLENGE INI 120 MIN: CPT | Performed by: ALLERGY & IMMUNOLOGY

## 2025-05-08 PROCEDURE — 3008F BODY MASS INDEX DOCD: CPT | Performed by: ALLERGY & IMMUNOLOGY

## 2025-05-08 PROCEDURE — 95004 PERQ TESTS W/ALRGNC XTRCS: CPT | Performed by: ALLERGY & IMMUNOLOGY

## 2025-05-08 PROCEDURE — 95018 ALL TSTG PERQ&IQ DRUGS/BIOL: CPT | Performed by: ALLERGY & IMMUNOLOGY

## 2025-05-08 ASSESSMENT — ENCOUNTER SYMPTOMS
CARDIOVASCULAR NEGATIVE: 1
CONSTITUTIONAL NEGATIVE: 1
GASTROINTESTINAL NEGATIVE: 1
HEMATOLOGIC/LYMPHATIC NEGATIVE: 1
RESPIRATORY NEGATIVE: 1
MUSCULOSKELETAL NEGATIVE: 1
EYES NEGATIVE: 1
ALLERGIC/IMMUNOLOGIC NEGATIVE: 1

## 2025-05-08 NOTE — PATIENT INSTRUCTIONS
Your allergy testing to penicillin was negative today and you successfully tolerated a graded dose challenge to amoxicillin     This tests for immediate, life-threatening allergic reactions to penicillin, but does not predict the possibility of delayed allergic reactions such as rash.     You may use penicillin antibiotics in the future and please let us know if you have any further problems.  Our phone number is [phone].

## 2025-05-08 NOTE — PROGRESS NOTES
"Luisa Ingrma presents for penicillin allergy testing.  She has no new complaints.  Since last visit she tried peanut butter (Taye's cup) and [place]ian chocolate cake with no adverse reaction.     Review of Systems   Constitutional: Negative.    HENT: Negative.     Eyes: Negative.    Respiratory: Negative.     Cardiovascular: Negative.    Gastrointestinal: Negative.    Musculoskeletal: Negative.    Skin: Negative.    Allergic/Immunologic: Negative.    Hematological: Negative.      Vital signs:  /88   Pulse 76   Temp 36.8 °C (98.3 °F)   Resp 16   Ht 1.638 m (5' 4.5\")   Wt 68 kg (150 lb) Comment: patient weighed herself @ home today.  SpO2 99%   BMI 25.35 kg/m²     Physical Exam:  GENERAL: Alert, oriented and in no acute distress.     LUNGS: No wheezing    SKIN: No  hives or angioedema noted    See testing flowsheet for details:   Penicillin allergy testing negative.  Tolerated graded dose challenge to amoxicillin with no adverse reaction.     Impression:  1. Penicillin allergy    2. Allergy to amoxicillin      Assessment and Plan:  Penicillin allergy testing is negative and tolerated 800 mg amoxicillin.  We discussed that she may use penicillin antibiotics in the future.  Discussed that testing addresses risk of IgE-mediated reaction (hives, itching, swelling, low blood pressure, anaphylaxis) but not risk of delayed T cell mediated reaction which could consist of delayed drug rash.  If any further problems with penicillin, she was instructed to let us know.          "
VT
VTACH

## 2025-05-22 ENCOUNTER — HOSPITAL ENCOUNTER (OUTPATIENT)
Dept: CARDIOLOGY | Facility: HOSPITAL | Age: 45
Discharge: HOME | End: 2025-05-22
Payer: COMMERCIAL

## 2025-05-22 DIAGNOSIS — R06.09 DYSPNEA ON EXERTION: ICD-10-CM

## 2025-05-22 PROCEDURE — 94621 CARDIOPULM EXERCISE TESTING: CPT

## 2025-05-22 PROCEDURE — 94621 CARDIOPULM EXERCISE TESTING: CPT | Performed by: INTERNAL MEDICINE

## 2025-05-30 DIAGNOSIS — R06.02 SHORTNESS OF BREATH: Primary | ICD-10-CM

## 2025-05-30 DIAGNOSIS — R06.09 DYSPNEA ON EXERTION: ICD-10-CM

## 2025-05-30 RX ORDER — FLUTICASONE PROPIONATE AND SALMETEROL 250; 50 UG/1; UG/1
1 POWDER RESPIRATORY (INHALATION)
Qty: 60 EACH | Refills: 11 | Status: SHIPPED | OUTPATIENT
Start: 2025-05-30 | End: 2026-05-30

## 2025-05-30 NOTE — PROGRESS NOTES
CPET non-diagnostic due to submaximal effort, TTE shows mild diastolic dysfunction which does not explain the severity of MsJacky Ingram's symptoms. Will trial ICS/LABA in the event there is asthma that is being missed on testing. See MyChart exchange.

## 2025-06-06 ENCOUNTER — OFFICE VISIT (OUTPATIENT)
Dept: CARDIOLOGY | Facility: HOSPITAL | Age: 45
End: 2025-06-06
Payer: COMMERCIAL

## 2025-06-06 VITALS
WEIGHT: 149 LBS | SYSTOLIC BLOOD PRESSURE: 112 MMHG | OXYGEN SATURATION: 98 % | DIASTOLIC BLOOD PRESSURE: 75 MMHG | HEIGHT: 65 IN | HEART RATE: 82 BPM | BODY MASS INDEX: 24.83 KG/M2

## 2025-06-06 DIAGNOSIS — R06.02 SHORTNESS OF BREATH: ICD-10-CM

## 2025-06-06 DIAGNOSIS — E78.5 HYPERLIPIDEMIA, UNSPECIFIED HYPERLIPIDEMIA TYPE: ICD-10-CM

## 2025-06-06 DIAGNOSIS — Z87.898 HISTORY OF PALPITATIONS: Primary | ICD-10-CM

## 2025-06-06 LAB
ATRIAL RATE: 82 BPM
P AXIS: 46 DEGREES
P OFFSET: 192 MS
P ONSET: 145 MS
PR INTERVAL: 150 MS
Q ONSET: 220 MS
QRS COUNT: 14 BEATS
QRS DURATION: 82 MS
QT INTERVAL: 356 MS
QTC CALCULATION(BAZETT): 415 MS
QTC FREDERICIA: 395 MS
R AXIS: 45 DEGREES
T AXIS: 49 DEGREES
T OFFSET: 398 MS
VENTRICULAR RATE: 82 BPM

## 2025-06-06 PROCEDURE — 99214 OFFICE O/P EST MOD 30 MIN: CPT | Performed by: NURSE PRACTITIONER

## 2025-06-06 PROCEDURE — 1036F TOBACCO NON-USER: CPT | Performed by: NURSE PRACTITIONER

## 2025-06-06 PROCEDURE — 99212 OFFICE O/P EST SF 10 MIN: CPT | Performed by: NURSE PRACTITIONER

## 2025-06-06 PROCEDURE — 93005 ELECTROCARDIOGRAM TRACING: CPT | Performed by: NURSE PRACTITIONER

## 2025-06-06 PROCEDURE — 3008F BODY MASS INDEX DOCD: CPT | Performed by: NURSE PRACTITIONER

## 2025-06-06 NOTE — PATIENT INSTRUCTIONS
Schedule a [facility] - [phone]    Obtain FLP    Follow up in 3 months     Call for any concerns

## 2025-06-06 NOTE — PROGRESS NOTES
Subjective   Luisa Ingram is a 45 y.o. female.    Chief Complaint:  Shortness of Breath    Mrs. Ingram returns for her annual follow up. She unfortunately has been struggling with some dyspnea lately, mostly with exertion though at rest as well. She recently had an echocardiogram and cardiopulmonary stress test, both of which were unremarkable. She is working with a pulmonologist and was started on a new inhaler. She denies any exertional chest pain or new swelling. She offers no other cardiovascular complaints or concerns today. She denies any complaints of chest pain, lightheadedness, dizziness, palpitations, syncope, orthopnea, paroxysmal nocturnal dyspnea, lower extremity swelling or bleeding concerns.        Review of Systems   All other systems reviewed and are negative.      Objective   Physical Exam  Constitutional:       Appearance: Healthy appearance. In no distress  Pulmonary:      Effort: Pulmonary effort is normal.      Breath sounds: Normal breath sounds.   Cardiovascular:      Normal rate. Regular rhythm. Normal S1. Normal S2.       Murmurs: There is no murmur.      Carotids: right carotid pulse +2, no bruit heard over the right carotid. left carotid pulse +2, no bruit heard over the left carotid.  Edema:     Peripheral edema absent.   Abdominal:      Palpations: Abdomen is soft.   Musculoskeletal:       Cervical back: Normal range of motion.   Skin:     General: Skin is warm and dry. Normal color and pigmentation   Neurological:      Mental Status: Alert and oriented to person, place and time.   Psychiatric:     Mood and Affect: appropriate mood and appropriate affect.     EKG obtained and reviewed. Normal sinus rhythm. HR 82    Current Outpatient Medications:     albuterol 90 mcg/actuation inhaler, Inhale 2 puffs every 4 hours if needed for shortness of breath., Disp: 18 g, Rfl: 11    cholecalciferol (Vitamin D-3) 25 MCG (1000 UT) capsule, Take 1 capsule (25 mcg) by mouth once daily., Disp: ,  Rfl:     clotrimazole-betamethasone (Lotrisone) cream, Apply 1 Application topically 2 times a day., Disp: , Rfl:     diphenhydrAMINE (Sominex) 25 mg tablet, Take 1-2 tablets (25-50 mg) by mouth if needed., Disp: , Rfl:     eculizumab (Soliris) 10 mg/mL solution injection, Use as directed, Disp: , Rfl:     ferrous sulfate 325 (65 Fe) MG tablet, Take 1 tablet by mouth every other day., Disp: , Rfl:     fluticasone (Flonase) 50 mcg/actuation nasal spray, Administer 1 spray into each nostril once daily. Shake gently. Before first use, prime pump. After use, clean tip and replace cap., Disp: 16 g, Rfl: 0    folic acid (Folvite) 800 mcg tablet, Take 1 tablet (0.8 mg) by mouth once daily., Disp: , Rfl:     heparin sodium,porcine (heparin, porcine,) 1,000 unit/mL injection, USE AS DIRECTED., Disp: , Rfl:     lamoTRIgine (LaMICtal) 200 mg tablet, Take 1 tablet (200 mg) by mouth 2 times a day., Disp: 180 tablet, Rfl: 1    lidocaine-prilocaine (Emla) 2.5-2.5 % cream, APPLY 1 INCH TO IV SITE PRIOR TO CHEMO TREATMENT., Disp: , Rfl:     loperamide (Imodium A-D) 2 mg tablet, TAKE 2 TABLETS INITIALLY, FOLLOWED BY 1 TABLET AFTER EACH LOOSE BOWEL MOVEMENT. DO NOT EXCEED 8 TABLETS/DAY., Disp: , Rfl:     metoprolol succinate XL (Toprol-XL) 25 mg 24 hr tablet, Take 1.5 tablets (37.5 mg) by mouth once daily in the evening., Disp: 135 tablet, Rfl: 3      Lab Review:   Lab Results   Component Value Date     01/24/2025    K 4.5 01/24/2025     01/24/2025    CO2 26 01/24/2025    BUN 10 01/24/2025    CREATININE 0.73 01/24/2025    GLUCOSE 85 01/24/2025    CALCIUM 9.9 01/24/2025     Lab Results   Component Value Date    WBC 4.5 01/24/2025    HGB 14.3 01/24/2025    HCT 43.2 01/24/2025    MCV 89 01/24/2025     01/24/2025     Lab Results   Component Value Date    CHOL 202 (H) 10/12/2023    TRIG 101 10/12/2023    HDL 59.9 10/12/2023       Assessment/Plan   Mrs. Ingram is a pleasant 45-year-old  female with a past medical  history significant for palpitations, lupus, myasthenia gravis followed by neurology and history of thymoma s/p thymectomy in 2002. Echocardiogram 5/2025 showed an EF of 62% normal RV systolic function and no significant valvular disease. NM stress test 9/2017 showed no ischemia. She had a Holter monitor 8/2020 showing normal sinus rhythm without significant arrhythmia. Cardiopulmonary stress test 5/2025 was non diagnostic due to suboptimal effort, likely consistent with deconditioning. She presents today for routine follow up stable from a cardiac standpoint. Her VS and EKG remain stable. She unfortunately continues to struggle with dyspnea, and is working with her pulmonologist on this. In order to further risk stratify her, I will schedule her for a CACS, and have her obtain a FLP as this has not been done in awhile. I will call her with her results once available and make further recommendations at that time. I will have her continue all medications unchanged. She will follow up with us in clinic 3 months. Should she continue to struggle with dyspnea, and depending on the results of her CACS, we may want to consider a coronary CT angiogram to better define her coronary anatomy. She knows to call for any concerns.

## 2025-06-09 ENCOUNTER — APPOINTMENT (OUTPATIENT)
Dept: PRIMARY CARE | Facility: CLINIC | Age: 45
End: 2025-06-09
Payer: COMMERCIAL

## 2025-06-09 VITALS
BODY MASS INDEX: 25.23 KG/M2 | DIASTOLIC BLOOD PRESSURE: 70 MMHG | TEMPERATURE: 97.6 F | HEART RATE: 90 BPM | SYSTOLIC BLOOD PRESSURE: 102 MMHG | OXYGEN SATURATION: 100 % | WEIGHT: 151.6 LBS

## 2025-06-09 DIAGNOSIS — R06.09 DYSPNEA ON EXERTION: ICD-10-CM

## 2025-06-09 DIAGNOSIS — L30.1 DYSHIDROTIC ECZEMA: ICD-10-CM

## 2025-06-09 DIAGNOSIS — R06.09 DYSPNEA ON EXERTION: Primary | ICD-10-CM

## 2025-06-09 PROBLEM — G40.909 SEIZURE, EPILEPTIC (MULTI): Status: RESOLVED | Noted: 2023-03-15 | Resolved: 2025-06-09

## 2025-06-09 PROBLEM — R56.9 SEIZURE (MULTI): Status: RESOLVED | Noted: 2024-06-27 | Resolved: 2025-06-09

## 2025-06-09 PROBLEM — C37: Status: RESOLVED | Noted: 2024-06-27 | Resolved: 2025-06-09

## 2025-06-09 PROCEDURE — G2211 COMPLEX E/M VISIT ADD ON: HCPCS | Performed by: FAMILY MEDICINE

## 2025-06-09 PROCEDURE — 1036F TOBACCO NON-USER: CPT | Performed by: FAMILY MEDICINE

## 2025-06-09 PROCEDURE — 99214 OFFICE O/P EST MOD 30 MIN: CPT | Performed by: FAMILY MEDICINE

## 2025-06-09 RX ORDER — FLUTICASONE PROPIONATE AND SALMETEROL 100; 50 UG/1; UG/1
1 POWDER RESPIRATORY (INHALATION) 2 TIMES DAILY
Qty: 180 EACH | Refills: 1 | Status: SHIPPED | OUTPATIENT
Start: 2025-06-09

## 2025-06-09 RX ORDER — CLOBETASOL PROPIONATE 0.5 MG/G
OINTMENT TOPICAL 2 TIMES DAILY
Qty: 60 G | Refills: 3 | Status: SHIPPED | OUTPATIENT
Start: 2025-06-09 | End: 2026-02-04

## 2025-06-09 RX ORDER — FLUTICASONE PROPIONATE AND SALMETEROL 100; 50 UG/1; UG/1
1 POWDER RESPIRATORY (INHALATION)
Qty: 60 EACH | Refills: 11 | Status: SHIPPED | OUTPATIENT
Start: 2025-06-09 | End: 2025-06-09

## 2025-06-09 ASSESSMENT — ENCOUNTER SYMPTOMS
OCCASIONAL FEELINGS OF UNSTEADINESS: 0
LOSS OF SENSATION IN FEET: 0
DEPRESSION: 0

## 2025-06-09 ASSESSMENT — PATIENT HEALTH QUESTIONNAIRE - PHQ9
1. LITTLE INTEREST OR PLEASURE IN DOING THINGS: NOT AT ALL
2. FEELING DOWN, DEPRESSED OR HOPELESS: NOT AT ALL
SUM OF ALL RESPONSES TO PHQ9 QUESTIONS 1 AND 2: 0

## 2025-06-09 NOTE — PROGRESS NOTES
Assessment/Plan     Discussed incorporating band workouts and increasing her treadmill time as she sees fit.   If she feels winded / fatigued to drop the speed on treadmill to 1.0 so she can recover and then resume at the 2.0 speed.     Pulmonology recommended wixela so I sent this rx in to her pharmacy. Follow up in 3 months.     Problem List Items Addressed This Visit    None  Visit Diagnoses         Dyspnea on exertion    -  Primary    Relevant Medications    fluticasone propion-salmeteroL (Wixela Inhub) 100-50 mcg/dose diskus inhaler      Dyshidrotic eczema        Relevant Medications    clobetasol (Temovate) 0.05 % ointment            Subjective   Patient ID: Luisa Ingram is a 45 y.o. female who presents for 3 Month Follow Up.    Patient presents for a 3-month follow-up for chronic health conditions.  Last time we had seen herShe was having continued shortness of breath.  She was considering changing her medications with Dr. Ashley DEL VALLE neuromuscular medication medicine.  She was also instructed to try some conditioning exercise and increase her intensity on the treadmill every week by 0.2 speed for 10 minutes 2 times per week.  We discussed that at the next visit we may increase time on the treadmill to 15 minutes and decreased original pace.  Since her last visit she did meet with Dr. Segundo DEL VALLE on March 10, 2025 and Dr. Rod did not believe that the shortness of breath was not related to the myasthenia and encouraged her to continue follow-up with pulmonology.  She did see Dr. Emmanuel here in this office on March 26, 2025 due to acute sinusitis and was treated with azithromycin.  She was seen by allergy and immunology on April 3, 2025 allergy testing was negative to peanut and tree nut she was given information to schedule penicillin testing.  She saw Dr. Park in pulmonology on April 18, 2025 they were to proceed with CPET to elucidate etiology of dyspnea and TTE to rule out heart disease as a cause  though low suspicion.  Stress test was also ordered.  She went back on May 8, 2025 for penicillin allergy testing and it was negative.  She is able to utilize penicillins in the future if needed.      She did see cardiology on June 6, 2025 for continued dyspnea her echocardiogram showed an EF of 62% and normal RV systolic function and no significant valvular disease.  The stress test was nondiagnostic due to suboptimal effort likely consistent with deconditioning she presented for routine follow-up to cardiology after the fact vital signs and EKG stable she was also scheduled for a CAC    Eczema - lotions     Objective   /70 (BP Location: Left arm, Patient Position: Sitting, BP Cuff Size: Adult)   Pulse 90   Temp 36.4 °C (97.6 °F) (Skin)   Wt 68.8 kg (151 lb 9.6 oz)   SpO2 100%   BMI 25.23 kg/m²     Physical Exam:     Constitutional:   General: not in acute distress  Appearance: normal appearance, non-toxic, not ill-appearing or diaphoretic.   HENT:   Head: Normocephalic and atraumatic  Eyes: MERA, DO JEFRY Washington spent a total of 31 minutes on the date of the service which included preparing to see the patient, face-to-face patient care, completing clinical documentation, performing a medically appropriate examination, counseling and educating the patient/family/caregiver and ordering medications, tests, or procedures.

## 2025-06-10 LAB
CHOLEST SERPL-MCNC: 190 MG/DL
CHOLEST/HDLC SERPL: 3.4 (CALC)
HDLC SERPL-MCNC: 56 MG/DL
LDLC SERPL CALC-MCNC: 111 MG/DL (CALC)
NONHDLC SERPL-MCNC: 134 MG/DL (CALC)
TRIGL SERPL-MCNC: 119 MG/DL

## 2025-06-13 ENCOUNTER — APPOINTMENT (OUTPATIENT)
Dept: CARDIOLOGY | Facility: HOSPITAL | Age: 45
End: 2025-06-13
Payer: COMMERCIAL

## 2025-07-07 DIAGNOSIS — G70.00 MYASTHENIA GRAVIS ASSOCIATED WITH THYMOMA: ICD-10-CM

## 2025-07-07 DIAGNOSIS — D49.89 MYASTHENIA GRAVIS ASSOCIATED WITH THYMOMA: ICD-10-CM

## 2025-07-07 RX ORDER — LAMOTRIGINE 200 MG/1
200 TABLET ORAL 2 TIMES DAILY
Qty: 180 TABLET | Refills: 1 | Status: SHIPPED | OUTPATIENT
Start: 2025-07-07

## 2025-07-11 ENCOUNTER — OFFICE VISIT (OUTPATIENT)
Dept: PULMONOLOGY | Facility: CLINIC | Age: 45
End: 2025-07-11
Payer: COMMERCIAL

## 2025-07-11 VITALS
TEMPERATURE: 98.2 F | BODY MASS INDEX: 25.16 KG/M2 | DIASTOLIC BLOOD PRESSURE: 78 MMHG | OXYGEN SATURATION: 99 % | WEIGHT: 151 LBS | RESPIRATION RATE: 16 BRPM | HEIGHT: 65 IN | SYSTOLIC BLOOD PRESSURE: 113 MMHG | HEART RATE: 88 BPM

## 2025-07-11 DIAGNOSIS — R06.09 DYSPNEA ON EXERTION: ICD-10-CM

## 2025-07-11 DIAGNOSIS — R06.02 SHORTNESS OF BREATH: Primary | ICD-10-CM

## 2025-07-11 PROCEDURE — 1036F TOBACCO NON-USER: CPT | Performed by: STUDENT IN AN ORGANIZED HEALTH CARE EDUCATION/TRAINING PROGRAM

## 2025-07-11 PROCEDURE — 99214 OFFICE O/P EST MOD 30 MIN: CPT | Performed by: STUDENT IN AN ORGANIZED HEALTH CARE EDUCATION/TRAINING PROGRAM

## 2025-07-11 PROCEDURE — 3008F BODY MASS INDEX DOCD: CPT | Performed by: STUDENT IN AN ORGANIZED HEALTH CARE EDUCATION/TRAINING PROGRAM

## 2025-07-11 ASSESSMENT — ENCOUNTER SYMPTOMS
SLEEP DISTURBANCE: 0
ABDOMINAL PAIN: 0
COUGH: 0
PALPITATIONS: 0
DIZZINESS: 0
CHEST TIGHTNESS: 0
WHEEZING: 0
FEVER: 0
VOICE CHANGE: 0
SHORTNESS OF BREATH: 1
MYALGIAS: 1
CHILLS: 0
ARTHRALGIAS: 1
CHOKING: 0
UNEXPECTED WEIGHT CHANGE: 0
JOINT SWELLING: 1
LIGHT-HEADEDNESS: 0
TROUBLE SWALLOWING: 0

## 2025-07-11 NOTE — PATIENT INSTRUCTIONS
Please schedule the lung function testing (spirometry) to test your diaphragm  Please continue your Advair inhaler twice daily every day for another ~4 weeks and give me a call to let me know if you are having symptom improvement or if your symptoms are not improving. If not, we can do the methacholine challenge test we talked about   Please continue your exercise regimen, you are doing a great job and I agree with adding resistance bands!  You may use your albuterol as needed every 4-6 hours. Be aware it can cause palpitations so if this is an issue, we can try switching it to levalbuterol    Follow up in 3 months
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
## 2025-07-18 ENCOUNTER — HOSPITAL ENCOUNTER (OUTPATIENT)
Dept: RESPIRATORY THERAPY | Facility: HOSPITAL | Age: 45
Discharge: HOME | End: 2025-07-18
Payer: COMMERCIAL

## 2025-07-18 ENCOUNTER — APPOINTMENT (OUTPATIENT)
Dept: PULMONOLOGY | Facility: CLINIC | Age: 45
End: 2025-07-18
Payer: COMMERCIAL

## 2025-07-18 DIAGNOSIS — R06.02 SHORTNESS OF BREATH: ICD-10-CM

## 2025-07-18 PROCEDURE — 94010 BREATHING CAPACITY TEST: CPT

## 2025-07-21 ENCOUNTER — APPOINTMENT (OUTPATIENT)
Dept: NEUROLOGY | Facility: CLINIC | Age: 45
End: 2025-07-21
Payer: COMMERCIAL

## 2025-07-21 VITALS
HEIGHT: 65 IN | HEART RATE: 86 BPM | WEIGHT: 151 LBS | BODY MASS INDEX: 25.16 KG/M2 | RESPIRATION RATE: 18 BRPM | DIASTOLIC BLOOD PRESSURE: 80 MMHG | SYSTOLIC BLOOD PRESSURE: 112 MMHG

## 2025-07-21 DIAGNOSIS — D49.89 MYASTHENIA GRAVIS ASSOCIATED WITH THYMOMA: Primary | ICD-10-CM

## 2025-07-21 DIAGNOSIS — R06.09 DYSPNEA ON EXERTION: ICD-10-CM

## 2025-07-21 DIAGNOSIS — G70.00 MYASTHENIA GRAVIS ASSOCIATED WITH THYMOMA: Primary | ICD-10-CM

## 2025-07-21 PROCEDURE — 3008F BODY MASS INDEX DOCD: CPT | Performed by: PSYCHIATRY & NEUROLOGY

## 2025-07-21 PROCEDURE — 99215 OFFICE O/P EST HI 40 MIN: CPT | Performed by: PSYCHIATRY & NEUROLOGY

## 2025-07-21 PROCEDURE — 1036F TOBACCO NON-USER: CPT | Performed by: PSYCHIATRY & NEUROLOGY
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
## 2025-07-25 ENCOUNTER — PROCEDURE VISIT (OUTPATIENT)
Dept: NEUROLOGY | Facility: HOSPITAL | Age: 45
End: 2025-07-25
Payer: COMMERCIAL

## 2025-07-25 ENCOUNTER — APPOINTMENT (OUTPATIENT)
Dept: NEUROLOGY | Facility: HOSPITAL | Age: 45
End: 2025-07-25
Payer: COMMERCIAL

## 2025-07-25 DIAGNOSIS — R06.00 DYSPNEA AND RESPIRATORY ABNORMALITY: Primary | ICD-10-CM

## 2025-07-25 DIAGNOSIS — D49.89 MYASTHENIA GRAVIS ASSOCIATED WITH THYMOMA: Primary | ICD-10-CM

## 2025-07-25 DIAGNOSIS — G70.00 MYASTHENIA GRAVIS ASSOCIATED WITH THYMOMA: Primary | ICD-10-CM

## 2025-07-25 DIAGNOSIS — R06.89 DYSPNEA AND RESPIRATORY ABNORMALITY: Primary | ICD-10-CM

## 2025-07-25 PROCEDURE — 76604 US EXAM CHEST: CPT | Performed by: STUDENT IN AN ORGANIZED HEALTH CARE EDUCATION/TRAINING PROGRAM

## 2025-07-25 PROCEDURE — 99213 OFFICE O/P EST LOW 20 MIN: CPT | Performed by: PSYCHIATRY & NEUROLOGY

## 2025-07-25 PROCEDURE — 99213 OFFICE O/P EST LOW 20 MIN: CPT | Mod: 25 | Performed by: PSYCHIATRY & NEUROLOGY

## 2025-07-25 NOTE — PROGRESS NOTES
NEUROMUSCULAR ULTRASOUND OF THE BILATERAL CHEST (DIAPHRAGM)     INDICATION:  Clinical information: History of myasthenia gravis. Reports continued shortness of breath with exertion. Evaluate for diaphragmatic dysfunction.     Neuromuscular ultrasound to be performed:     (a) to evaluate the echotexture and size of the right and left diaphragm muscles;  (b) to assess the change in thickness and excursion of the diaphragm.      HEIGHT: 5 ft 5 in  WEIGHT: 151 lbs.     COMPARISON:  None.     TECHNIQUE:  Neuromuscular ultrasound was performed of the chest using a [employer] ultrasound machine with a 15-6 MHz matrix linear transducer and a 5-1 curvilinear transducer.     With the patient supine, the right and left diaphragm muscle was examined with B-mode ultrasound (high frequency linear transducer) with an intercostal view. The transducer was placed at the anterior axillary line to obtain a sagittal image at the intercostal space between the seventh and eighth ribs. With an image spanning two ribs, the subcutaneous tissue, intercostal muscles and diaphragm muscle were identified. With the patient supine, the diaphragm muscle was next examined with B-mode ultrasound (low frequency curvilinear transducer) with an anterior subcostal view. The transducer was directed medially, cranially, and dorsally, so that the ultrasound visualized the posterior diaphragm. B-mode was used to visualize the diaphragm moving toward or away from the transducer. The imaging was then changed to M mode to measure excursion.     FINDINGS:  At the end of expiration, the thickness of the right diaphragm was 2.0 mm (NL > 2 mm).      At the end of inspiration, the thickness of the right diaphragm was 3.4 mm. Thus, the change in thickness of the right tiff-diaphragm (thickness at end-inspiration minus the thickness at end-expiration) / (thickness at end-expiration) was 70% (NL > 20%).     On M-mode, the right diaphragm moved toward the transducer during  inspiration. The range of motion of the right diaphragm from the resting expiratory position to full inspiration was 3.8 cm (NL > 1.9 cm; values > 2.5 cm exclude any significant diaphragmatic dysfunction).      At the end of expiration, the thickness of the left diaphragm was 1.9 mm (NL > 2 mm).      At the end of inspiration, the thickness of the left diaphragm was 3.1 mm. Thus, the change in thickness of the left tiff-diaphragm (thickness at end-inspiration minus the thickness at end-expiration) / (thickness at end-expiration) was 65% (NL > 20%).     On M-mode, the left diaphragm could not be adequately visualized due to artifact.       IMPRESSION:  This is a normal neuromuscular ultrasound examination of the right and left diaphragms. There was no ultrasound evidence of abnormal diaphragm echogenicity, atrophy, reduced change in thickness or reduced movement on the right side.     There was no ultrasound evidence of abnormal diaphragm echogenicity, atrophy, or reduced change in thickness on the left side. Excursion of the left hemidiaphragm could not be reliable measured due to artifact. Further assessment with fluoroscopic sniff test is planned for next month.            Performed by: Matt Edge DO  Authorized by: Matt Edge DO

## 2025-07-26 LAB
MGC ASCENT PFT - FEV1 - POST: 2.64
MGC ASCENT PFT - FEV1 - PRE: 2.87
MGC ASCENT PFT - FEV1 - PREDICTED: 2.75
MGC ASCENT PFT - FVC - POST: 3.32
MGC ASCENT PFT - FVC - PRE: 3.47
MGC ASCENT PFT - FVC - PREDICTED: 3.34
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
## 2025-08-19 ENCOUNTER — HOSPITAL ENCOUNTER (OUTPATIENT)
Dept: RADIOLOGY | Facility: HOSPITAL | Age: 45
Discharge: HOME | End: 2025-08-19
Payer: COMMERCIAL

## 2025-08-19 DIAGNOSIS — D49.89 MYASTHENIA GRAVIS ASSOCIATED WITH THYMOMA: ICD-10-CM

## 2025-08-19 DIAGNOSIS — R06.09 DYSPNEA ON EXERTION: ICD-10-CM

## 2025-08-19 DIAGNOSIS — G70.00 MYASTHENIA GRAVIS ASSOCIATED WITH THYMOMA: ICD-10-CM

## 2025-08-19 PROCEDURE — 76000 FLUOROSCOPY <1 HR PHYS/QHP: CPT

## 2025-08-27 ENCOUNTER — TELEPHONE (OUTPATIENT)
Dept: SLEEP MEDICINE | Facility: CLINIC | Age: 45
End: 2025-08-27
Payer: COMMERCIAL

## 2025-08-27 DIAGNOSIS — R06.02 SHORTNESS OF BREATH: Primary | ICD-10-CM

## 2025-08-27 RX ORDER — METHACHOLINE CHLORIDE 0 MG/3 ML
3 VIAL, NEBULIZER (ML) INHALATION ONCE AS NEEDED
OUTPATIENT
Start: 2025-08-27

## 2025-08-27 RX ORDER — ALBUTEROL SULFATE 90 UG/1
4 INHALANT RESPIRATORY (INHALATION) EVERY 10 MIN PRN
OUTPATIENT
Start: 2025-08-27

## 2025-08-27 RX ORDER — METHACHOLINE CHLORIDE 12 MG/3 ML
12 VIAL, NEBULIZER (ML) INHALATION ONCE AS NEEDED
OUTPATIENT
Start: 2025-08-27

## 2025-08-27 RX ORDER — METHACHOLINE CHLORIDE 48 MG/3 ML
48 VIAL, NEBULIZER (ML) INHALATION ONCE AS NEEDED
OUTPATIENT
Start: 2025-08-27

## 2025-08-27 RX ORDER — METHACHOLINE CHLORIDE 3 MG/3 ML
3 VIAL, NEBULIZER (ML) INHALATION ONCE AS NEEDED
OUTPATIENT
Start: 2025-08-27

## 2025-08-27 RX ORDER — METHACHOLINE CHLORIDE 0.75/3ML
0.75 VIAL, NEBULIZER (ML) INHALATION ONCE AS NEEDED
OUTPATIENT
Start: 2025-08-27

## 2025-08-27 RX ORDER — METHACHOLINE CHLORIDE 0.1875/3ML
0.19 VIAL, NEBULIZER (ML) INHALATION ONCE AS NEEDED
OUTPATIENT
Start: 2025-08-27

## 2025-08-27 RX ORDER — ALBUTEROL SULFATE 0.83 MG/ML
3 SOLUTION RESPIRATORY (INHALATION) EVERY 10 MIN PRN
OUTPATIENT
Start: 2025-08-27

## 2025-09-02 ENCOUNTER — HOSPITAL ENCOUNTER (OUTPATIENT)
Dept: RESPIRATORY THERAPY | Facility: HOSPITAL | Age: 45
Discharge: HOME | End: 2025-09-02
Payer: COMMERCIAL

## 2025-09-02 DIAGNOSIS — R06.02 SHORTNESS OF BREATH: ICD-10-CM

## 2025-09-02 PROCEDURE — 2500000004 HC RX 250 GENERAL PHARMACY W/ HCPCS (ALT 636 FOR OP/ED): Performed by: STUDENT IN AN ORGANIZED HEALTH CARE EDUCATION/TRAINING PROGRAM

## 2025-09-02 PROCEDURE — 2500000002 HC RX 250 W HCPCS SELF ADMINISTERED DRUGS (ALT 637 FOR MEDICARE OP, ALT 636 FOR OP/ED): Performed by: STUDENT IN AN ORGANIZED HEALTH CARE EDUCATION/TRAINING PROGRAM

## 2025-09-02 PROCEDURE — 2500000001 HC RX 250 WO HCPCS SELF ADMINISTERED DRUGS (ALT 637 FOR MEDICARE OP): Performed by: STUDENT IN AN ORGANIZED HEALTH CARE EDUCATION/TRAINING PROGRAM

## 2025-09-02 PROCEDURE — 94640 AIRWAY INHALATION TREATMENT: CPT

## 2025-09-02 PROCEDURE — 95070 INHLJ BRNCL CHALLENGE TSTG: CPT

## 2025-09-02 RX ORDER — METHACHOLINE CHLORIDE 0 MG/3 ML
3 VIAL, NEBULIZER (ML) INHALATION ONCE AS NEEDED
Status: COMPLETED | OUTPATIENT
Start: 2025-09-02 | End: 2025-09-02

## 2025-09-02 RX ORDER — ALBUTEROL SULFATE 90 UG/1
4 INHALANT RESPIRATORY (INHALATION) EVERY 10 MIN PRN
Status: DISCONTINUED | OUTPATIENT
Start: 2025-09-02 | End: 2025-09-03 | Stop reason: HOSPADM

## 2025-09-02 RX ORDER — METHACHOLINE CHLORIDE 3 MG/3 ML
3 VIAL, NEBULIZER (ML) INHALATION ONCE AS NEEDED
Status: COMPLETED | OUTPATIENT
Start: 2025-09-02 | End: 2025-09-02

## 2025-09-02 RX ORDER — ALBUTEROL SULFATE 0.83 MG/ML
3 SOLUTION RESPIRATORY (INHALATION) EVERY 10 MIN PRN
Status: DISCONTINUED | OUTPATIENT
Start: 2025-09-02 | End: 2025-09-03 | Stop reason: HOSPADM

## 2025-09-02 RX ORDER — METHACHOLINE CHLORIDE 48 MG/3 ML
48 VIAL, NEBULIZER (ML) INHALATION ONCE AS NEEDED
Status: COMPLETED | OUTPATIENT
Start: 2025-09-02 | End: 2025-09-02

## 2025-09-02 RX ORDER — METHACHOLINE CHLORIDE 0.75/3ML
0.75 VIAL, NEBULIZER (ML) INHALATION ONCE AS NEEDED
Status: COMPLETED | OUTPATIENT
Start: 2025-09-02 | End: 2025-09-02

## 2025-09-02 RX ORDER — METHACHOLINE CHLORIDE 12 MG/3 ML
12 VIAL, NEBULIZER (ML) INHALATION ONCE AS NEEDED
Status: COMPLETED | OUTPATIENT
Start: 2025-09-02 | End: 2025-09-02

## 2025-09-02 RX ORDER — METHACHOLINE CHLORIDE 0.1875/3ML
0.19 VIAL, NEBULIZER (ML) INHALATION ONCE AS NEEDED
Status: COMPLETED | OUTPATIENT
Start: 2025-09-02 | End: 2025-09-02

## 2025-09-02 RX ADMIN — Medication 0.19 MG: at 17:47

## 2025-09-02 RX ADMIN — ALBUTEROL SULFATE 4 PUFF: 90 AEROSOL, METERED RESPIRATORY (INHALATION) at 18:37

## 2025-09-02 RX ADMIN — Medication 3 MG: at 17:56

## 2025-09-02 RX ADMIN — Medication 48 MG: at 18:12

## 2025-09-02 RX ADMIN — Medication 12 MG: at 18:02

## 2025-09-02 RX ADMIN — Medication 0.75 MG: at 17:51

## 2025-09-02 RX ADMIN — ALBUTEROL SULFATE 3 ML: 2.5 SOLUTION RESPIRATORY (INHALATION) at 18:17

## 2025-09-02 RX ADMIN — METHACHOLINE CHLORIDE INHALATION SOLUTION 3 ML: KIT at 17:40

## 2025-09-03 LAB
MGC ASCENT PFT - FEV1 - POST: 2.85
MGC ASCENT PFT - FEV1 - PRE: 2.89
MGC ASCENT PFT - FEV1 - PREDICTED: 2.74
MGC ASCENT PFT - FVC - POST: 3.54
MGC ASCENT PFT - FVC - PRE: 3.54
MGC ASCENT PFT - FVC - PREDICTED: 3.33

## 2025-09-08 ENCOUNTER — APPOINTMENT (OUTPATIENT)
Dept: PRIMARY CARE | Facility: CLINIC | Age: 45
End: 2025-09-08
Payer: COMMERCIAL

## 2025-12-08 ENCOUNTER — APPOINTMENT (OUTPATIENT)
Dept: NEUROLOGY | Facility: CLINIC | Age: 45
End: 2025-12-08
Payer: COMMERCIAL

## 2026-01-30 ENCOUNTER — APPOINTMENT (OUTPATIENT)
Dept: RHEUMATOLOGY | Facility: CLINIC | Age: 46
End: 2026-01-30
Payer: COMMERCIAL